# Patient Record
Sex: MALE | Race: WHITE | URBAN - METROPOLITAN AREA
[De-identification: names, ages, dates, MRNs, and addresses within clinical notes are randomized per-mention and may not be internally consistent; named-entity substitution may affect disease eponyms.]

---

## 2017-05-17 ENCOUNTER — TRANSFERRED RECORDS (OUTPATIENT)
Dept: HEALTH INFORMATION MANAGEMENT | Facility: CLINIC | Age: 82
End: 2017-05-17

## 2017-05-24 ENCOUNTER — TRANSFERRED RECORDS (OUTPATIENT)
Dept: HEALTH INFORMATION MANAGEMENT | Facility: CLINIC | Age: 82
End: 2017-05-24

## 2017-06-08 ENCOUNTER — TRANSFERRED RECORDS (OUTPATIENT)
Dept: HEALTH INFORMATION MANAGEMENT | Facility: CLINIC | Age: 82
End: 2017-06-08

## 2017-06-30 ENCOUNTER — TRANSFERRED RECORDS (OUTPATIENT)
Dept: HEALTH INFORMATION MANAGEMENT | Facility: CLINIC | Age: 82
End: 2017-06-30

## 2018-06-25 ENCOUNTER — TRANSFERRED RECORDS (OUTPATIENT)
Dept: HEALTH INFORMATION MANAGEMENT | Facility: CLINIC | Age: 83
End: 2018-06-25

## 2018-08-04 ASSESSMENT — ENCOUNTER SYMPTOMS
STIFFNESS: 1
BACK PAIN: 1
MUSCLE WEAKNESS: 1
JOINT SWELLING: 0
ARTHRALGIAS: 1
MUSCLE CRAMPS: 1
MYALGIAS: 1
NECK PAIN: 1

## 2018-08-09 NOTE — TELEPHONE ENCOUNTER
FUTURE VISIT INFORMATION      FUTURE VISIT INFORMATION:    Date: 08/16/2018    Time: 12:00 pm     [place] [facility] [place]:    Referring provider:  Dr Donna Martini    Referring providers clinic:      Reason for visit/diagnosis        NOTES (FOR ALL VISITS) STATUS DETAILS   OFFICE NOTE from referring provider Care Everywhere 07/09/2018   OFFICE NOTE from other specialist N/A    DISCHARGE SUMMARY from hospital N/A    DISCHARGE REPORT from the ER N/A    OPERATIVE REPORT N/A    MEDICATION LIST Internal    IMAGING  (FOR ALL VISITS)     EMG N/A    EEG N/A    ECT N/A    MRI (HEAD, NECK, SPINE) Internal PACS  06/09/2017, 06/15/2015   CT (HEAD, NECK, SPINE) N/A    OTHER     Interpretation of Outside MR Spine (06/15/2015 1:35 PM)  DX Lumbar Spine 2-3 Views (06/22/2015 1:06 PM)

## 2018-08-16 ENCOUNTER — PRE VISIT (OUTPATIENT)
Dept: NEUROLOGY | Facility: CLINIC | Age: 83
End: 2018-08-16

## 2018-08-16 ENCOUNTER — OFFICE VISIT (OUTPATIENT)
Dept: NEUROLOGY | Facility: CLINIC | Age: 83
End: 2018-08-16
Payer: MEDICARE

## 2018-08-16 VITALS
SYSTOLIC BLOOD PRESSURE: 134 MMHG | DIASTOLIC BLOOD PRESSURE: 69 MMHG | HEIGHT: 69 IN | WEIGHT: 148 LBS | HEART RATE: 73 BPM | BODY MASS INDEX: 21.92 KG/M2

## 2018-08-16 DIAGNOSIS — G56.21 NEUROPATHY, ULNAR AT ELBOW, RIGHT: ICD-10-CM

## 2018-08-16 DIAGNOSIS — G54.5 PARSONAGE-TURNER SYNDROME: Primary | ICD-10-CM

## 2018-08-16 DIAGNOSIS — G54.0 BRACHIAL PLEXOPATHY: Primary | ICD-10-CM

## 2018-08-16 RX ORDER — GABAPENTIN 100 MG/1
100 CAPSULE ORAL 3 TIMES DAILY
Qty: 90 CAPSULE | Refills: 11 | Status: SHIPPED | OUTPATIENT
Start: 2018-08-16 | End: 2018-09-14

## 2018-08-16 RX ORDER — METOPROLOL SUCCINATE 25 MG/1
25 TABLET, EXTENDED RELEASE ORAL
COMMUNITY
Start: 2018-06-04

## 2018-08-16 RX ORDER — LOSARTAN POTASSIUM 25 MG/1
25 TABLET ORAL DAILY
Refills: 3 | COMMUNITY
Start: 2018-06-22

## 2018-08-16 RX ORDER — NITROGLYCERIN 0.4 MG/1
0.4 TABLET SUBLINGUAL
COMMUNITY
Start: 2018-06-04

## 2018-08-16 RX ORDER — AMLODIPINE BESYLATE 5 MG/1
2.5 TABLET ORAL
COMMUNITY
Start: 2018-06-04

## 2018-08-16 RX ORDER — LANOLIN ALCOHOL/MO/W.PET/CERES
CREAM (GRAM) TOPICAL
COMMUNITY
Start: 2012-06-07

## 2018-08-16 ASSESSMENT — PAIN SCALES - GENERAL: PAINLEVEL: NO PAIN (0)

## 2018-08-16 NOTE — NURSING NOTE
Chief Complaint   Patient presents with     Consult     ump new shoulder weakness     Haylee Alba

## 2018-08-16 NOTE — PROGRESS NOTES
[facility]    Stanislav Lundy MRN# [id number]   Age: 83 year old YOB: 1934     Requesting physician: Kurtis Davila     Chief Complaint:     Referred by Dr Martini and Dr Whiting for right shoulder pain assessment      History of Present Illness:   Stanislav Lundy is a 83 year old right-handed gentleman who was referred to the neurology clinic by Dr. Donna Martini for right shouler pain and weakness out of proportion to the orthopedic injury of rotator cuff tear.    Mr. Lundy states that on February 1, 2018, he suddenly started to have severe pain in his right shoulder without any preceding injury. At the time, he went and saw orthopedics doctor in [place], and was told that he had a rotator cuff tear. He was given steroid injection and sent to physical therapy. He reports that physical therapy helped, however, he continued to have pain. On May 13, 2018, he went back to orthopedics for which he received another steroid injection. As the pain persisted, he saught another orthopedics opinion at [facility] in [place]. He was told that his pain seems to be out of proportion for rotator cuff tear and that he may have a nerve damage. Currently, he describes his pain as a shooting pain that starts proximal in the anterior shoulder and radiates distally into the forearm but not the hand. He keeps the arm internally rotated as external rotation reproduces the pain. He has to lift his right arm up with the left to elevate the arm above his shoulder.  Shooting pains radiate down the arm with movement of the arm not the neck.     He does think he has lost muscle bulk in the arm.    He has no numbness in his hand.      Past Medical History:   Diagnosis Date     Chronic rhinitis     Abstracted 06/12/02     Coronary atherosclerosis of unspecified type of vessel, native or graft 1990     Generalized osteoarthrosis, unspecified site     especially low back     Other and  unspecified hyperlipidemia        Patient Active Problem List   Diagnosis     Coronary atherosclerosis     Hyperlipidemia     Generalized osteoarthrosis, unspecified site     Chronic rhinitis       Past Surgical History:   Procedure Laterality Date     C NONSPECIFIC PROCEDURE      CABG, 3-vessel     C NONSPECIFIC PROCEDURE      Partial thyroidectomy     C NONSPECIFIC PROCEDURE  ,     S/P bilateral rotator cuff  , bilateral again in      C NONSPECIFIC PROCEDURE      S/P Right forearm fracture secondary to fall with a re-do graft from hip  Abstracted 02     C NONSPECIFIC PROCEDURE      S/P T&A     C NONSPECIFIC PROCEDURE      Dupuytren's contracture     C NONSPECIFIC PROCEDURE      Hardware removed from right forearm     HC COLONOSCOPY THRU STOMA, DIAGNOSTIC  2004    Normal     HC FLEX SIGMOIDOSCOPY W/WO MIKEL SPEC BY BRUSH/WASH  2001    Normal to 60 cm       Social History     Social History     Marital status:      Spouse name: Saray     Number of children: 2     Years of education: N/A     Occupational History     [employer] Estate Retired     Social History Main Topics     Smoking status: Former Smoker     Packs/day: 1.00     Types: Cigarettes     Quit date: 1978     Smokeless tobacco: Not on file     Alcohol use No     Drug use: No     Sexual activity: Not Currently     Other Topics Concern      Service No     Blood Transfusions No     Social History Narrative    Semi-retired .       Family History   Problem Relation Age of Onset     C.A.D. Mother       age 90     Hypertension Mother      Lipids Mother      C.A.D. Father       age 90, first MI in his mid 60's     Hypertension Father      Lipids Father      C.A.D. Brother       age 70 of heart problems and Prostate CA     Family History Negative Brother      Born 1928     Family History Negative Brother      Born 1941     Family History Negative Sister       "Born 1930, has COPD     Family History Negative Sister      Born , has COPD     Family History Negative Brother       in MVA age 19     Cancer Brother      Born 1936, has had melanoma resected for apparent cure       Current Outpatient Prescriptions   Medication Sig     amLODIPine (NORVASC) 5 MG tablet Take 5 mg by mouth     apixaban ANTICOAGULANT (ELIQUIS) 5 MG tablet      cyanocobalamin (VITAMIN  B-12) 1000 MCG tablet 4 times weekly     gabapentin (NEURONTIN) 100 MG capsule Take 1 capsule (100 mg) by mouth 3 times daily     metoprolol succinate (TOPROL-XL) 25 MG 24 hr tablet Take 25 mg by mouth     nitroGLYcerin (NITROSTAT) 0.4 MG sublingual tablet Place 0.4 mg under the tongue     Omega-3 Fatty Acids (OMEGA 3 PO)      ALTACE 2.5 MG OR CAPS 1 CAPSULE EVERY DAY     LIPITOR 10 MG OR TABS 1 tab PO QD (Once per day)     losartan (COZAAR) 25 MG tablet Take 25 mg by mouth daily     NASACORT 55 MCG/ACT NA AERS 2 SPRAYS IN EACH NOSTRIL QD (Once per day)     NITROGLYCERIN 0.4 MG SL SUBL 1 sublingually prn chest discomfort, may repeat each five minutes if needed to a maximum of 3 consecutive tablets     TOPROL XL 25 MG OR TB24 1 TABLET DAILY     No current facility-administered medications for this visit.           Allergies   Allergen Reactions     Cats      Mold Other (See Comments)     Niacin Unknown     Elevated liver tests     No Clinical Screening - See Comments Other (See Comments)     No Known Drug Allergies        ROS: Please see HPI all other systems review and negative.    Physical Examination:  /69  Pulse 73  Ht 1.753 m (5' 9\")  Wt 67.1 kg (148 lb)  BMI 21.86 kg/m2  General Appearance:  Well groomed and cooperative with examination. NAD  Neurological Examination  Cognition: No aphasia or dysarthria. Grossly oriented . Attention and recall intact.  Cranial Nerves:   General Motor Survey: Atrophy of right deltoid muscle, infraspinatus, dorsal interossei, weakness of the right interosseous muscles " 4/5, slight decrease of strength of the right upper extremity possibly due to pain. No tremor. Muscle bulk tone and strength in left UE and bilateral lowers are normal. No winging of scapula  Coordination: Normal coordination  Reflexes: Reflexes diffuse hyporeflexic 1+ in UE and LE, plantar responses are flexor   Sensory Examination:   Vibration: normal in upper extremity   Pinprick: decreased on posterior aspect of the right upper arm    Light Touch: decreased on posterior aspect of the right upper arm  Gait: Normal gait which is stable on turns.     Cardiovascular Examination:  Heart is regular in rate and rhythm to auscultation. No significant murmurs. No carotid bruits. No significant peripheral edema. Pedal pulses are palpable bilaterally.     Musculoskeletal Examination:  Neck rotation is limited on the right side      Impression/Recommendations:  1.  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  In addition the patient has developed muscle atrophy and weakness.  I have some concern this represents a idiopathic brachial plexopathy which traditionally presents with a significant amount of pain followed by atrophy, weakness and numbness.    Thankfully Dr. Kessler kindly worked the patient in for an EMG at the time of his visit today.  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  I will send the patient for an MRI of the brachial plexus to make sure we are not missing any malignancy or inflammatory lesion.  The patient has a 40-pack-year history of smoking so we want to make sure we rule this out.  If normal continued observation for healing and pain management would be the next steps.  I did start the patient on gabapentin 100 mg 3 times daily.  I cautioned him regarding possible side effects including dizziness and gait  instability.      Scribed by SYLVIA Martinez for Dr. Jennifer Villa MD.     I personally met with the patient and performed the history and physical examination to confirm all findings.  I have reviewed Ms. Manriquez's documentation and edited for corrections.    Jennifer Villa MD [facility]  Department of Neurology  Pager [phone]        Answers for HPI/ROS submitted by the patient on 8/4/2018   General Symptoms: No  Skin Symptoms: No  HENT Symptoms: No  EYE SYMPTOMS: No  HEART SYMPTOMS: No  LUNG SYMPTOMS: No  INTESTINAL SYMPTOMS: No  URINARY SYMPTOMS: No  REPRODUCTIVE SYMPTOMS: No  SKELETAL SYMPTOMS: Yes  BLOOD SYMPTOMS: No  NERVOUS SYSTEM SYMPTOMS: No  MENTAL HEALTH SYMPTOMS: No  Back pain: Yes  Muscle aches: Yes  Neck pain: Yes  Swollen joints: No  Joint pain: Yes  Bone pain: Yes  Muscle cramps: Yes  Muscle weakness: Yes  Joint stiffness: Yes  Bone fracture: No  PHQ-2 Score: 1      Scribed by SYLVIA Martinez for Dr. Jennifer Villa MD.

## 2018-08-16 NOTE — MR AVS SNAPSHOT
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 Bring a list of your current medicines to your exam (including vitamins, minerals and over-the-counter drugs).  You may or may not receive intravenous (IV) contrast for this exam pending the discretion of the Radiologist.  You do not need to do anything special to prepare.  The MRI machine uses a strong magnet. Please wear clothes without metal (snaps, zippers). A sweatsuit works well, or we may give you a hospital gown.  Please remove any body piercings and hair extensions before you arrive. You will also remove watches, jewelry, hairpins, wallets, dentures, partial dental plates and hearing aids. You may wear contact lenses, and you may be able to wear your rings. We have a safe place to keep your personal items, but it is safer to leave them at home.  **IMPORTANT** THE INSTRUCTIONS BELOW ARE ONLY FOR THOSE PATIENTS WHO HAVE BEEN PRESCRIBED SEDATION OR GENERAL ANESTHESIA DURING THEIR MRI PROCEDURE:  IF YOUR DOCTOR PRESCRIBED ORAL SEDATION (take medicine to help you relax during your exam):   You must get the medicine from your doctor (oral medication) before you arrive. Bring the medicine to the exam. Do not take it at home. You ll be told when to take it upon  arriving for your exam.   Arrive one hour early. Bring someone who can take you home after the test. Your medicine will make you sleepy. After the exam, you may not drive, take a bus or take a taxi by yourself.  IF YOUR DOCTOR PRESCRIBED IV SEDATION:   Arrive one hour early. Bring someone who can take you home after the test. Your medicine will make you sleepy. After the exam, you may not drive, take a bus or take a taxi by yourself.   No eating 6 hours before your exam. You may have clear liquids up until 4 hours before your exam. (Clear liquids include water, clear tea, black coffee and fruit juice without pulp.)  IF YOUR DOCTOR PRESCRIBED ANESTHESIA (be asleep for your exam):   Arrive 1 1/2 hours early. Bring someone who can take you home after the test. You may not drive, take a bus or take a taxi by yourself.   No eating 8 hours before your exam. You may have clear liquids up until 4 hours before your exam. (Clear liquids include water, clear tea, black coffee and fruit juice without pulp.)   You will spend four to five hours in the recovery room.  Please call the Imaging Department at your exam site with any questions.            Sep 13, 2018 12:30 PM CDT   (Arrive by 12:15 PM)   Return Visit with Jennifer Villa MD   [facility] ([facility])    [address] [place] [address] [phone]              Future tests that were ordered for you today     Open Future Orders        Priority Expected Expires Ordered    Needle EMG Each Extremity w/Paraspinal Area Complete (59078) Routine  8/16/2019 8/16/2018    MR Brachial Plexus Right wo & w Contrast Routine  8/16/2019 8/16/2018    EMG Routine  8/16/2019 8/16/2018            Who to contact     Please call your clinic at [phone] to:    Ask questions about your health    Make or cancel appointments    Discuss your medicines    Learn about your test results    Speak to your doctor             Additional Information About Your Visit        [employer] Information     [employer] gives you secure access to your electronic health record. If you see a primary care provider, you can also send messages to your care team and make appointments. If you have questions, please call your primary care clinic.  If you do not have a primary care provider, please call [phone] and they will assist you.      [employer] is an electronic gateway that provides easy, online access to your medical records. With [employer], you can request a clinic appointment, read your test results, renew a prescription or communicate with your care team.     To access your existing account, please contact your [facility] or call [phone] for assistance.        Care EveryWhere ID     This is your Care EveryWhere ID. This could be used by other organizations to access your [facility] medical records  [id number]         Blood Pressure from Last 3 Encounters:   08/16/18 134/69   08/05/05 152/74   07/18/03 110/60    Weight from Last 3 Encounters:   08/16/18 67.1 kg (148 lb)   08/05/05 79.4 kg (175 lb)   07/18/03 73.9 kg (163 lb)              We Performed the Following     HC NCS MOTOR W OR W/O F-WAVE, 9 OR 10          Today's Medication Changes          These changes are accurate as of 8/16/18  2:33 PM.  If you have any questions, ask your nurse or doctor.               Start taking these medicines.        Dose/Directions    gabapentin 100 MG capsule   Commonly known as:  NEURONTIN   Used for:  Parsonage-Arteaga syndrome   Started by:  Jennifer Villa MD        Dose:  100 mg   Take 1 capsule (100 mg) by mouth 3 times daily   Quantity:  90 capsule   Refills:  11         Stop taking these medicines if you haven't already. Please contact your care team if you have questions.     aspirin 81 MG tablet   Stopped by:  Jennifer Villa MD                Where to get your medicines      These medications  were sent to [facility] [id number] [facility] - [place], [place] - [address]  [address], [facility] [address]     Phone:  [phone]     gabapentin 100 MG capsule                Primary Care Provider Office Phone # Fax #    Kurtis Masterson [phone]       [facility] [address] [place] [address]        Equal Access to Services     [facility] Center[facility]SEMARY : Hadii aad ku hadasho Soomaali, waaxda luqadaha, qaybta kaalmada adeegyada, waxay idiin hayaan adeeg kharash la'aan . So [facility] [phone].    ATENCIÓN: Si habla español, tiene a lu disposición servicios gratuitos de asistencia lingüística. [place] al [phone].    We comply with applicable federal civil rights laws and [place] laws. We do not discriminate on the basis of race, color, national origin, age, disability, sex, sexual orientation, or gender identity.            Thank you!     Thank you for choosing [facility]  for your care. Our goal is always to provide you with excellent care. Hearing back from our patients is one way we can continue to improve our services. Please take a few minutes to complete the written survey that you may receive in the mail after your visit with us. Thank you!             Your Updated Medication List - Protect others around you: Learn how to safely use, store and throw away your medicines at www.disposemymeds.org.          This list is accurate as of 8/16/18  2:33 PM.  Always use your most recent med list.                   Brand Name Dispense Instructions for use Diagnosis    ALTACE 2.5 MG capsule   Generic drug:  ramipril     60    1 CAPSULE EVERY DAY        amLODIPine 5 MG tablet    NORVASC     Take 5 mg by mouth        cyanocobalamin 1000 MCG tablet    vitamin  B-12     4 times weekly        ELIQUIS 5 MG tablet   Generic drug:  apixaban ANTICOAGULANT           gabapentin 100 MG capsule    NEURONTIN    90 capsule    Take 1 capsule (100 mg) by mouth 3 times daily    Lou  syndrome       LIPITOR 10 MG tablet   Generic drug:  atorvastatin     90    1 tab PO QD (Once per day)        losartan 25 MG tablet    COZAAR     Take 25 mg by mouth daily        NASACORT 55 MCG/ACT Aers   Generic drug:  TRIAMCINOLONE ACETONIDE(NASAL)     1    2 SPRAYS IN EACH NOSTRIL QD (Once per day)        * nitroGLYcerin 0.4 MG sublingual tablet    NITROSTAT    25    1 sublingually prn chest discomfort, may repeat each five minutes if needed to a maximum of 3 consecutive tablets        * nitroGLYcerin 0.4 MG sublingual tablet    NITROSTAT     Place 0.4 mg under the tongue        OMEGA 3 PO           * TOPROL XL 25 MG 24 hr tablet   Generic drug:  metoprolol succinate      1 TABLET DAILY    Coronary atherosclerosis of unspecified type of vessel, native or graft, Essential and other specified forms of tremor       * metoprolol succinate 25 MG 24 hr tablet    TOPROL-XL     Take 25 mg by mouth        * Notice:  This list has 4 medication(s) that are the same as other medications prescribed for you. Read the directions carefully, and ask your doctor or other care provider to review them with you.

## 2018-08-16 NOTE — LETTER
8/16/2018     RE: Stanislav Lundy  [address]  [facility] [address]     Dear Colleague,    Thank you for referring your patient, Stanislav Lundy, to the [facility] NEUROLOGY at [facility]. Please see a copy of my visit note below.        [facility]    Stanislav Lundy MRN# [id number]   Age: 83 year old YOB: 1934     Requesting physician: Kurtis Davila     Chief Complaint:     Referred by Dr Martini and Dr Whiting for right shoulder pain assessment      History of Present Illness:   Stanislav Lundy is a 83 year old right-handed gentleman who was referred to the neurology clinic by Dr. Donna Martini for right shouler pain and weakness out of proportion to the orthopedic injury of rotator cuff tear.    Mr. Lundy states that on February 1, 2018, he suddenly started to have severe pain in his right shoulder without any preceding injury. At the time, he went and saw orthopedics doctor in [place], and was told that he had a rotator cuff tear. He was given steroid injection and sent to physical therapy. He reports that physical therapy helped, however, he continued to have pain. On May 13, 2018, he went back to orthopedics for which he received another steroid injection. As the pain persisted, he saught another orthopedics opinion at [facility] in [place]. He was told that his pain seems to be out of proportion for rotator cuff tear and that he may have a nerve damage. Currently, he describes his pain as a shooting pain that starts proximal in the anterior shoulder and radiates distally into the forearm but not the hand. He keeps the arm internally rotated as external rotation reproduces the pain. He has to lift his right arm up with the left to elevate the arm above his shoulder.  Shooting pains radiate down the arm with movement of the arm not the neck.     He does think he has lost muscle bulk in the arm.    He has  no numbness in his hand.      Past Medical History:   Diagnosis Date     Chronic rhinitis     Abstracted 02     Coronary atherosclerosis of unspecified type of vessel, native or graft      Generalized osteoarthrosis, unspecified site     especially low back     Other and unspecified hyperlipidemia        Patient Active Problem List   Diagnosis     Coronary atherosclerosis     Hyperlipidemia     Generalized osteoarthrosis, unspecified site     Chronic rhinitis       Past Surgical History:   Procedure Laterality Date     C NONSPECIFIC PROCEDURE      CABG, 3-vessel     C NONSPECIFIC PROCEDURE      Partial thyroidectomy     C NONSPECIFIC PROCEDURE  ,     S/P bilateral rotator cuff  , bilateral again in      C NONSPECIFIC PROCEDURE      S/P Right forearm fracture secondary to fall with a re-do graft from hip  Abstracted 02     C NONSPECIFIC PROCEDURE      S/P T&A     C NONSPECIFIC PROCEDURE      Dupuytren's contracture     C NONSPECIFIC PROCEDURE      Hardware removed from right forearm     HC COLONOSCOPY THRU STOMA, DIAGNOSTIC  2004    Normal     HC FLEX SIGMOIDOSCOPY W/WO MIKEL SPEC BY BRUSH/WASH  2001    Normal to 60 cm       Social History     Social History     Marital status:      Spouse name: Saray     Number of children: 2     Years of education: N/A     Occupational History     [employer] Estate Retired     Social History Main Topics     Smoking status: Former Smoker     Packs/day: 1.00     Types: Cigarettes     Quit date: 1978     Smokeless tobacco: Not on file     Alcohol use No     Drug use: No     Sexual activity: Not Currently     Other Topics Concern      Service No     Blood Transfusions No     Social History Narrative    Semi-retired .       Family History   Problem Relation Age of Onset     C.A.D. Mother       age 90     Hypertension Mother      Lipids Mother      C.A.D. Father       age 90,  "first MI in his mid 60's     Hypertension Father      Lipids Father      PAULO.[email or web address].BHAVANI. Brother       age 70 of heart problems and Prostate CA     Family History Negative Brother      Born 1928     Family History Negative Brother      Born 1     Family History Negative Sister      Born 1930, has COPD     Family History Negative Sister      Born , has COPD     Family History Negative Brother       in MVA age 19     Cancer Brother      Born 1936, has had melanoma resected for apparent cure       Current Outpatient Prescriptions   Medication Sig     amLODIPine (NORVASC) 5 MG tablet Take 5 mg by mouth     apixaban ANTICOAGULANT (ELIQUIS) 5 MG tablet      cyanocobalamin (VITAMIN  B-12) 1000 MCG tablet 4 times weekly     gabapentin (NEURONTIN) 100 MG capsule Take 1 capsule (100 mg) by mouth 3 times daily     metoprolol succinate (TOPROL-XL) 25 MG 24 hr tablet Take 25 mg by mouth     nitroGLYcerin (NITROSTAT) 0.4 MG sublingual tablet Place 0.4 mg under the tongue     Omega-3 Fatty Acids (OMEGA 3 PO)      ALTACE 2.5 MG OR CAPS 1 CAPSULE EVERY DAY     LIPITOR 10 MG OR TABS 1 tab PO QD (Once per day)     losartan (COZAAR) 25 MG tablet Take 25 mg by mouth daily     NASACORT 55 MCG/ACT NA AERS 2 SPRAYS IN EACH NOSTRIL QD (Once per day)     NITROGLYCERIN 0.4 MG SL SUBL 1 sublingually prn chest discomfort, may repeat each five minutes if needed to a maximum of 3 consecutive tablets     TOPROL XL 25 MG OR TB24 1 TABLET DAILY     No current facility-administered medications for this visit.           Allergies   Allergen Reactions     Cats      Mold Other (See Comments)     Niacin Unknown     Elevated liver tests     No Clinical Screening - See Comments Other (See Comments)     No Known Drug Allergies        ROS: Please see HPI all other systems review and negative.    Physical Examination:  /69  Pulse 73  Ht 1.753 m (5' 9\")  Wt 67.1 kg (148 lb)  BMI 21.86 kg/m2  General Appearance:  Well groomed and cooperative " with examination. NAD  Neurological Examination  Cognition: No aphasia or dysarthria. Grossly oriented . Attention and recall intact.  Cranial Nerves:   General Motor Survey: Atrophy of right deltoid muscle, infraspinatus, dorsal interossei, weakness of the right interosseous muscles 4/5, slight decrease of strength of the right upper extremity possibly due to pain. No tremor. Muscle bulk tone and strength in left UE and bilateral lowers are normal. No winging of scapula  Coordination: Normal coordination  Reflexes: Reflexes diffuse hyporeflexic 1+ in UE and LE, plantar responses are flexor   Sensory Examination:   Vibration: normal in upper extremity   Pinprick: decreased on posterior aspect of the right upper arm    Light Touch: decreased on posterior aspect of the right upper arm  Gait: Normal gait which is stable on turns.     Cardiovascular Examination:  Heart is regular in rate and rhythm to auscultation. No significant murmurs. No carotid bruits. No significant peripheral edema. Pedal pulses are palpable bilaterally.     Musculoskeletal Examination:  Neck rotation is limited on the right side      Impression/Recommendations:  1.  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  In addition the patient has developed muscle atrophy and weakness.  I have some concern this represents a idiopathic brachial plexopathy which traditionally presents with a significant amount of pain followed by atrophy, weakness and numbness.    Thankfully Dr. Kessler kindly worked the patient in for an EMG at the time of his visit today.  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  I will send the patient for an MRI of the brachial plexus to make sure we are not missing any malignancy or inflammatory lesion.  The patient has a 40-pack-year history of  smoking so we want to make sure we rule this out.  If normal continued observation for healing and pain management would be the next steps.  I did start the patient on gabapentin 100 mg 3 times daily.  I cautioned him regarding possible side effects including dizziness and gait instability.      Scribed by SYLVIA Martinez for Dr. Jennifer Villa MD.     I personally met with the patient and performed the history and physical examination to confirm all findings.  I have reviewed Ms. Manriquez's documentation and edited for corrections.    Jennifer Villa MD [facility]  Department of Neurology  Pager [phone]        Answers for HPI/ROS submitted by the patient on 8/4/2018   General Symptoms: No  Skin Symptoms: No  HENT Symptoms: No  EYE SYMPTOMS: No  HEART SYMPTOMS: No  LUNG SYMPTOMS: No  INTESTINAL SYMPTOMS: No  URINARY SYMPTOMS: No  REPRODUCTIVE SYMPTOMS: No  SKELETAL SYMPTOMS: Yes  BLOOD SYMPTOMS: No  NERVOUS SYSTEM SYMPTOMS: No  MENTAL HEALTH SYMPTOMS: No  Back pain: Yes  Muscle aches: Yes  Neck pain: Yes  Swollen joints: No  Joint pain: Yes  Bone pain: Yes  Muscle cramps: Yes  Muscle weakness: Yes  Joint stiffness: Yes  Bone fracture: No  PHQ-2 Score: 1    Scribed by Ilene Manriquez MS3 for Dr. Jennifer Villa MD.     Again, thank you for allowing me to participate in the care of your patient.      Sincerely,    Jennifer Villa MD

## 2018-08-16 NOTE — MR AVS SNAPSHOT
After Visit Summary   8/16/2018    Stanislav Lundy    MRN: [id number]           Patient Information     Date Of Birth          9/7/1934        Visit Information        Provider Department      8/16/2018 12:00 PM Jennifer Villa MD [facility] Neurology        Today's Diagnoses     Parsonage-Arteaga syndrome    -  1       Follow-ups after your visit        Follow-up notes from your care team     Return in about 6 weeks (around 9/27/2018).      Your next 10 appointments already scheduled     Aug 25, 2018  8:30 AM CDT   MR BRACHIAL PLEXUS RIGHT WO & W CONTRAST with [id number]   [facility] [place] [facility] ([facility])    [address]  [facility] [address] [phone]           Take your medicines as usual, unless your doctor tells you not to. Bring a list of your current medicines to your exam (including vitamins, minerals and over-the-counter drugs).  You may or may not receive intravenous (IV) contrast for this exam pending the discretion of the Radiologist.  You do not need to do anything special to prepare.  The MRI machine uses a strong magnet. Please wear clothes without metal (snaps, zippers). A sweatsuit works well, or we may give you a hospital gown.  Please remove any body piercings and hair extensions before you arrive. You will also remove watches, jewelry, hairpins, wallets, dentures, partial dental plates and hearing aids. You may wear contact lenses, and you may be able to wear your rings. We have a safe place to keep your personal items, but it is safer to leave them at home.  **IMPORTANT** THE INSTRUCTIONS BELOW ARE ONLY FOR THOSE PATIENTS WHO HAVE BEEN PRESCRIBED SEDATION OR GENERAL ANESTHESIA DURING THEIR MRI PROCEDURE:  IF YOUR DOCTOR PRESCRIBED ORAL SEDATION (take medicine to help you relax during your exam):   You must get the medicine from your doctor (oral medication) before you arrive. Bring the medicine to the exam. Do  not take it at home. You ll be told when to take it upon arriving for your exam.   Arrive one hour early. Bring someone who can take you home after the test. Your medicine will make you sleepy. After the exam, you may not drive, take a bus or take a taxi by yourself.  IF YOUR DOCTOR PRESCRIBED IV SEDATION:   Arrive one hour early. Bring someone who can take you home after the test. Your medicine will make you sleepy. After the exam, you may not drive, take a bus or take a taxi by yourself.   No eating 6 hours before your exam. You may have clear liquids up until 4 hours before your exam. (Clear liquids include water, clear tea, black coffee and fruit juice without pulp.)  IF YOUR DOCTOR PRESCRIBED ANESTHESIA (be asleep for your exam):   Arrive 1 1/2 hours early. Bring someone who can take you home after the test. You may not drive, take a bus or take a taxi by yourself.   No eating 8 hours before your exam. You may have clear liquids up until 4 hours before your exam. (Clear liquids include water, clear tea, black coffee and fruit juice without pulp.)   You will spend four to five hours in the recovery room.  Please call the Imaging Department at your exam site with any questions.            Sep 13, 2018 12:30 PM CDT   (Arrive by 12:15 PM)   Return Visit with Jennifer Villa MD   [facility] Neurology ([facility])    [address] [place] [address] [phone]              Future tests that were ordered for you today     Open Future Orders        Priority Expected Expires Ordered    MR Brachial Plexus Right wo & w Contrast Routine  8/16/2019 8/16/2018    EMG Routine  8/16/2019 8/16/2018            Who to contact     Please call your clinic at [phone] to:    Ask questions about your health    Make or cancel appointments    Discuss your medicines    Learn about your test results    Speak to your doctor            Additional Information About Your  "Visit        RatingBugharGetMaid Information     Rebls gives you secure access to your electronic health record. If you see a primary care provider, you can also send messages to your care team and make appointments. If you have questions, please call your primary care clinic.  If you do not have a primary care provider, please call [phone] and they will assist you.      Rebls is an electronic gateway that provides easy, online access to your medical records. With Rebls, you can request a clinic appointment, read your test results, renew a prescription or communicate with your care team.     To access your existing account, please contact your [facility] or call [phone] for assistance.        Care EveryWhere ID     This is your Care EveryWhere ID. This could be used by other organizations to access your [place] medical records  [id number]        Your Vitals Were     Pulse Height BMI (Body Mass Index)             73 1.753 m (5' 9\") 21.86 kg/m2          Blood Pressure from Last 3 Encounters:   08/16/18 134/69   08/05/05 152/74   07/18/03 110/60    Weight from Last 3 Encounters:   08/16/18 67.1 kg (148 lb)   08/05/05 79.4 kg (175 lb)   07/18/03 73.9 kg (163 lb)                 Today's Medication Changes          These changes are accurate as of 8/16/18  2:24 PM.  If you have any questions, ask your nurse or doctor.               Start taking these medicines.        Dose/Directions    gabapentin 100 MG capsule   Commonly known as:  NEURONTIN   Used for:  Parsonage-Arteaga syndrome   Started by:  Jennifer Villa MD        Dose:  100 mg   Take 1 capsule (100 mg) by mouth 3 times daily   Quantity:  90 capsule   Refills:  11         Stop taking these medicines if you haven't already. Please contact your care team if you have questions.     aspirin 81 MG tablet   Stopped by:  Jennifer Villa MD                Where to get your medicines      These medications were " sent to [facility] [address] - [place], [place] - [address]  [address], [place] [address]     Phone:  [phone]     gabapentin 100 MG capsule                Primary Care Provider Office Phone # Fax #    Kurtis Masterson [phone]       [facility] [address]        Equal Access to Services     [facility] CenterROSEMARY : Hadii aad ku hadasho Soomaali, waaxda luqadaha, qaybta kaalmada adeegyada, waxay idiin hayaan adeeg kharash la'aan . So [facility] [place] [phone].    ATENCIÓN: Si habla español, tiene a lu disposición servicios gratuitos de asistencia lingüística. [place] al [phone].    We comply with applicable federal civil rights laws and [place] laws. We do not discriminate on the basis of race, color, national origin, age, disability, sex, sexual orientation, or gender identity.            Thank you!     Thank you for choosing [facility] NEUROLOGY  for your care. Our goal is always to provide you with excellent care. Hearing back from our patients is one way we can continue to improve our services. Please take a few minutes to complete the written survey that you may receive in the mail after your visit with us. Thank you!             Your Updated Medication List - Protect others around you: Learn how to safely use, store and throw away your medicines at www.disposemymeds.org.          This list is accurate as of 8/16/18  2:24 PM.  Always use your most recent med list.                   Brand Name Dispense Instructions for use Diagnosis    ALTACE 2.5 MG capsule   Generic drug:  ramipril     60    1 CAPSULE EVERY DAY        amLODIPine 5 MG tablet    NORVASC     Take 5 mg by mouth        cyanocobalamin 1000 MCG tablet    vitamin  B-12     4 times weekly        ELIQUIS 5 MG tablet   Generic drug:  apixaban ANTICOAGULANT           gabapentin 100 MG capsule    NEURONTIN    90 capsule    Take 1 capsule (100 mg) by mouth 3 times daily    Lou  syndrome       LIPITOR 10 MG tablet   Generic drug:  atorvastatin     90    1 tab PO QD (Once per day)        losartan 25 MG tablet    COZAAR     Take 25 mg by mouth daily        NASACORT 55 MCG/ACT Aers   Generic drug:  TRIAMCINOLONE ACETONIDE(NASAL)     1    2 SPRAYS IN EACH NOSTRIL QD (Once per day)        * nitroGLYcerin 0.4 MG sublingual tablet    NITROSTAT    25    1 sublingually prn chest discomfort, may repeat each five minutes if needed to a maximum of 3 consecutive tablets        * nitroGLYcerin 0.4 MG sublingual tablet    NITROSTAT     Place 0.4 mg under the tongue        OMEGA 3 PO           * TOPROL XL 25 MG 24 hr tablet   Generic drug:  metoprolol succinate      1 TABLET DAILY    Coronary atherosclerosis of unspecified type of vessel, native or graft, Essential and other specified forms of tremor       * metoprolol succinate 25 MG 24 hr tablet    TOPROL-XL     Take 25 mg by mouth        * Notice:  This list has 4 medication(s) that are the same as other medications prescribed for you. Read the directions carefully, and ask your doctor or other care provider to review them with you.

## 2018-08-16 NOTE — LETTER
8/16/2018     RE: Stanislav Lundy  [address]  [facility] [address]     Dear Colleague,    Thank you for referring your patient, Stanislav Lundy, to the [facility] EMG at [facility]. Please see a copy of my visit note below.        [facility]  Electrodiagnostic Laboratory        Nerve Conduction & EMG Report       Patient:       Stanislav Lundy  Patient ID:    [id number]  Gender:        Male  YOB: 1934  Age:           83 Years 11 Months      Referring Physician: Jennifer Villa MD    History & Examination:    83 year old man with a 6 month history of pain at the right shoulder and limited range of motion. There is weakness of right shoulder external rotation and abduction. Query right brachial plexopathy/Parsonage Arteaga syndrome.    Techniques: Motor and sensory conduction studies were done with surface recording electrodes. Temperature was monitored and recorded throughout the study. Upper extremities were maintained at a temperature of 32 degrees Centigrade or higher. EMG was done with a concentric needle electrode.     Results:    Bilateral median antidromic sensory NCSs recorded from digit II were normal without significant side-to-side differences. Bilateral radial antidromic sensory NCSs recorded from the snuffbox were probably normal for age. An amplitude of 13.1-13.6 uV, with normal values of >15 uV, is of questionable significance in an 83 year-old patient. Right ulnar antidromic sensory NCS recorded from digit V showed normal SNAP amplitude and attenuated conduction velocity. Right lateral antebrachial cutaneous antidromic sensory NCS showed no response. Left lateral antebrachial cutaneous antidromic sensory NCS was normal. Right median motor NCS showed prolonged distal latency, normal CMAP amplitudes, and conduction velocity. Right ulnar motor NCS showed normal distal latency, and CMAP amplitudes, low-normal conduction  velocity at the forearm, and attenuated conduction velocity at the elbow.   Needle EMG interestingly showed no fibrillations/positive waves in any muscle studied at the right upper extremity. Complex repetitive discharges (CRDs) were present at the right deltoid, supraspinatus, and pronator teres. Reduced recruitment of some large, long duration, but non-polyphasic MUPs was noted at the right biceps, pronator teres, and EDC. Some large, long duration, non-polyphasic MUPs with normal recruitment patterns were appreciated at the right infraspinatus. All other muscles studied showed normal MUP morphology and recruitment patterns. EMG of right brachioradialis, triceps, FDI, and C6 paraspinals was normal.     Interpretation:    Abnormal study. There is electrodiagnostic evidence of a chronic/remote right brachial plexopathy, predominantly affecting the upper trunk, and to a lesser extent the middle. There is also electrodiagnostic evidence of a rather mild right ulnar neuropathy at the elbow.    EMG Physician:     Domo Kessler MD      Sensory NCS      Nerve / Sites Rec. Site Onset Peak Ref. NP Amp Ref. PP Amp Dist Mateo Ref. Temp     ms ms ms  V  V  V cm m/s m/s  C   R MEDIAN - Dig II Anti      Wrist Dig II 2.92 4.22  11.9 10.0 18.1 14 48.0 48.0 31.9   L MEDIAN - Dig II Anti      Wrist Dig II 2.92 4.22  13.9 10.0 20.4 14 48.0 48.0 31.9   R ULNAR - Dig V Anti      Wrist Dig V 3.13 4.06  8.2 8.0 1.8 12.5 40.0 48.0 30.7      B.Elbow Dig V 3.39 4.22  4.8  0.57    30.4   R RADIAL - Snuff      Forearm Snuff 1.93 2.60  13.1 15.0 12.1 12.5 64.9 48.0 34.3   L RADIAL - Snuff      Forearm Snuff 2.60 3.28  13.6 15.0 16.4 12.5 48.0 48.0 31.7   R MUSCULOCUTANEOUS      Elbow Forearm NR NR 3.30 NR  NR 12 NR  32   L MUSCULOCUTANEOUS      Elbow Forearm 1.35 2.03 3.30 5.3  11.8 8 59.1  31.5     Motor NCS      Nerve / Sites Rec. Site Lat Ref. Amp Ref. Rel Amp Dist Mateo Ref. Dur. Area Temp.     ms ms mV mV % cm m/s m/s ms %  C   R  MEDIAN - APB      Wrist APB 4.64 4.40 10.8 5.0 100 8   7.24 100 32.9      Elbow APB 10.05  10.0  93 27 49.8 48.0 7.66 151 32.7   R ULNAR - ADM      Wrist ADM 3.44 3.50 11.3 5.0 100 8   4.32 100 33.8      B.Elbow ADM 8.44  10.0  88.9 24 48.0 48.0 5.26 96.9 33.6      A.Elbow ADM 10.78  8.9  78.6 8 34.1 48.0 5.47 97.3 33.5       EMG Summary Table     Spontaneous MUAP Recruitment    IA Fib PSW Fasc H.F. Amp Dur. PPP Pattern   R. DELTOID N None None None CRD N N N N   R. BICEPS N None None None None N 1+ N Reduced   R. BRACHIORADIALIS N None None None None N N N N   R. PRON TERES N None None None CRD 1+ 2+ N Reduced   R. TRICEPS N None None None None N N N N   R. EXT DIG COMM N None None None None 1+ 1+ N Reduced   R. FIRST D INTEROSS N None None None None N N N N   R. SUPRASPINATUS N None None None CRD N N N N   R. INFRASPINATUS N None None None None 1+ 1+ N N   R. C6 PSP N None None None None N N N N                            Again, thank you for allowing me to participate in the care of your patient.      Sincerely,    Domo Kessler MD

## 2018-08-16 NOTE — PROGRESS NOTES
[facility]  Electrodiagnostic Laboratory    Nerve Conduction & EMG Report          Patient:       Stanislav Lundy  Patient ID:    [id number]  Gender:        Male  YOB: 1934  Age:           83 Years 11 Months      Referring Physician: Jennifer Villa MD    History & Examination:    83 year old man with a 6 month history of pain at the right shoulder and limited range of motion. There is weakness of right shoulder external rotation and abduction. Query right brachial plexopathy/Parsonage Arteaga syndrome.    Techniques: Motor and sensory conduction studies were done with surface recording electrodes. Temperature was monitored and recorded throughout the study. Upper extremities were maintained at a temperature of 32 degrees Centigrade or higher. EMG was done with a concentric needle electrode.     Results:    Bilateral median antidromic sensory NCSs recorded from digit II were normal without significant side-to-side differences. Bilateral radial antidromic sensory NCSs recorded from the snuffbox were probably normal for age. An amplitude of 13.1-13.6 uV, with normal values of >15 uV, is of questionable significance in an 83 year-old patient. Right ulnar antidromic sensory NCS recorded from digit V showed normal SNAP amplitude and attenuated conduction velocity. Right lateral antebrachial cutaneous antidromic sensory NCS showed no response. Left lateral antebrachial cutaneous antidromic sensory NCS was normal. Right median motor NCS showed prolonged distal latency, normal CMAP amplitudes, and conduction velocity. Right ulnar motor NCS showed normal distal latency, and CMAP amplitudes, low-normal conduction velocity at the forearm, and attenuated conduction velocity at the elbow.   Needle EMG interestingly showed no fibrillations/positive waves in any muscle studied at the right upper extremity. Complex repetitive discharges (CRDs) were present at the right deltoid, supraspinatus, and  pronator teres. Reduced recruitment of some large, long duration, but non-polyphasic MUPs was noted at the right biceps, pronator teres, and EDC. Some large, long duration, non-polyphasic MUPs with normal recruitment patterns were appreciated at the right infraspinatus. All other muscles studied showed normal MUP morphology and recruitment patterns. EMG of right brachioradialis, triceps, FDI, and C6 paraspinals was normal.     Interpretation:    Abnormal study. There is electrodiagnostic evidence of a chronic/remote right brachial plexopathy, predominantly affecting the upper trunk, and to a lesser extent the middle. There is also electrodiagnostic evidence of a rather mild right ulnar neuropathy at the elbow.    EMG Physician:     Domo Kessler MD      Sensory NCS      Nerve / Sites Rec. Site Onset Peak Ref. NP Amp Ref. PP Amp Dist Mateo Ref. Temp     ms ms ms  V  V  V cm m/s m/s  C   R MEDIAN - Dig II Anti      Wrist Dig II 2.92 4.22  11.9 10.0 18.1 14 48.0 48.0 31.9   L MEDIAN - Dig II Anti      Wrist Dig II 2.92 4.22  13.9 10.0 20.4 14 48.0 48.0 31.9   R ULNAR - Dig V Anti      Wrist Dig V 3.13 4.06  8.2 8.0 1.8 12.5 40.0 48.0 30.7      B.Elbow Dig V 3.39 4.22  4.8  0.57    30.4   R RADIAL - Snuff      Forearm Snuff 1.93 2.60  13.1 15.0 12.1 12.5 64.9 48.0 34.3   L RADIAL - Snuff      Forearm Snuff 2.60 3.28  13.6 15.0 16.4 12.5 48.0 48.0 31.7   R MUSCULOCUTANEOUS      Elbow Forearm NR NR 3.30 NR  NR 12 NR  32   L MUSCULOCUTANEOUS      Elbow Forearm 1.35 2.03 3.30 5.3  11.8 8 59.1  31.5     Motor NCS      Nerve / Sites Rec. Site Lat Ref. Amp Ref. Rel Amp Dist Mateo Ref. Dur. Area Temp.     ms ms mV mV % cm m/s m/s ms %  C   R MEDIAN - APB      Wrist APB 4.64 4.40 10.8 5.0 100 8   7.24 100 32.9      Elbow APB 10.05  10.0  93 27 49.8 48.0 7.66 151 32.7   R ULNAR - ADM      Wrist ADM 3.44 3.50 11.3 5.0 100 8   4.32 100 33.8      B.Elbow ADM 8.44  10.0  88.9 24 48.0 48.0 5.26 96.9 33.6      A.Elbow ADM 10.78  8.9   78.6 8 34.1 48.0 5.47 97.3 33.5       EMG Summary Table     Spontaneous MUAP Recruitment    IA Fib PSW Fasc H.F. Amp Dur. PPP Pattern   R. DELTOID N None None None CRD N N N N   R. BICEPS N None None None None N 1+ N Reduced   R. BRACHIORADIALIS N None None None None N N N N   R. PRON TERES N None None None CRD 1+ 2+ N Reduced   R. TRICEPS N None None None None N N N N   R. EXT DIG COMM N None None None None 1+ 1+ N Reduced   R. FIRST D INTEROSS N None None None None N N N N   R. SUPRASPINATUS N None None None CRD N N N N   R. INFRASPINATUS N None None None None 1+ 1+ N N   R. C6 PSP N None None None None N N N N

## 2018-08-25 ENCOUNTER — RADIANT APPOINTMENT (OUTPATIENT)
Dept: MRI IMAGING | Facility: CLINIC | Age: 83
End: 2018-08-25
Attending: PSYCHIATRY & NEUROLOGY
Payer: MEDICARE

## 2018-08-25 DIAGNOSIS — G54.5 PARSONAGE-TURNER SYNDROME: ICD-10-CM

## 2018-08-25 LAB
CREAT BLD-MCNC: 0.8 MG/DL (ref 0.66–1.25)
GFR SERPL CREATININE-BSD FRML MDRD: >90 ML/MIN/1.7M2

## 2018-08-25 RX ORDER — GADOBUTROL 604.72 MG/ML
7.5 INJECTION INTRAVENOUS ONCE
Status: COMPLETED | OUTPATIENT
Start: 2018-08-25 | End: 2018-08-25

## 2018-08-25 RX ADMIN — GADOBUTROL 6.5 ML: 604.72 INJECTION INTRAVENOUS at 08:45

## 2018-08-25 NOTE — DISCHARGE INSTRUCTIONS
MRI Contrast Discharge Instructions    The IV contrast you received today will pass out of your body in your  urine. This will happen in the next 24 hours. You will not feel this process.  Your urine will not change color.    Drink at least 4 extra glasses of water or juice today (unless your doctor  has restricted your fluids). This reduces the stress on your kidneys.  You may take your regular medicines.    If you are on dialysis: It is best to have dialysis today.    If you have a reaction: Most reactions happen right away. If you have  any new symptoms after leaving the hospital (such as hives or swelling),  call your hospital at the correct number below. Or call your family doctor.  If you have breathing distress or wheezing, call 911.    Special instructions: ***    I have read and understand the above information.    Signature:______________________________________ Date:___________    Staff:__________________________________________ Date:___________     Time:__________    [place] [facility] Departments:    ___[place]: [phone]  ___[facility]: [phone]  ___[place]: [phone] ___[facility]: [phone]  ___[facility]: [phone]  ___[facility]: [phone]  ___[phone]  ___[facility]: [phone]  ___[phone]

## 2018-08-30 ENCOUNTER — MYC MEDICAL ADVICE (OUTPATIENT)
Dept: NEUROLOGY | Facility: CLINIC | Age: 83
End: 2018-08-30

## 2018-09-05 NOTE — TELEPHONE ENCOUNTER
Spoke with patient about Images being pushed through PACS to [facility]. Report was faxed this morning.     Ranjith Jernigan MA

## 2018-09-13 ENCOUNTER — OFFICE VISIT (OUTPATIENT)
Dept: NEUROLOGY | Facility: CLINIC | Age: 83
End: 2018-09-13
Payer: MEDICARE

## 2018-09-13 VITALS — SYSTOLIC BLOOD PRESSURE: 130 MMHG | OXYGEN SATURATION: 100 % | HEART RATE: 110 BPM | DIASTOLIC BLOOD PRESSURE: 66 MMHG

## 2018-09-13 DIAGNOSIS — M75.121 COMPLETE TEAR OF RIGHT ROTATOR CUFF: ICD-10-CM

## 2018-09-13 DIAGNOSIS — G54.5 PARSONAGE-TURNER SYNDROME: Primary | ICD-10-CM

## 2018-09-13 PROBLEM — M54.2 NECK PAIN, CHRONIC: Status: ACTIVE | Noted: 2017-06-26

## 2018-09-13 PROBLEM — G89.29 NECK PAIN, CHRONIC: Status: ACTIVE | Noted: 2017-06-26

## 2018-09-13 PROBLEM — I48.91 ATRIAL FIBRILLATION (H): Status: ACTIVE | Noted: 2018-09-13

## 2018-09-13 PROBLEM — M75.111 INCOMPLETE TEAR OF RIGHT ROTATOR CUFF: Status: ACTIVE | Noted: 2018-05-18

## 2018-09-13 PROBLEM — M25.519 SHOULDER PAIN: Status: ACTIVE | Noted: 2018-06-25

## 2018-09-13 PROBLEM — M75.01 ADHESIVE CAPSULITIS OF RIGHT SHOULDER: Status: ACTIVE | Noted: 2018-05-18

## 2018-09-13 PROBLEM — E87.1 HYPONATREMIA: Status: ACTIVE | Noted: 2018-06-22

## 2018-09-13 PROBLEM — I10 ESSENTIAL HYPERTENSION: Status: ACTIVE | Noted: 2018-09-12

## 2018-09-13 PROBLEM — E07.9 DISORDER OF THYROID: Status: ACTIVE | Noted: 2018-09-13

## 2018-09-13 PROBLEM — F51.01 PRIMARY INSOMNIA: Status: ACTIVE | Noted: 2018-09-12

## 2018-09-13 RX ORDER — LIDOCAINE 50 MG/G
PATCH TOPICAL
Qty: 30 PATCH | Refills: 1 | Status: SHIPPED | OUTPATIENT
Start: 2018-09-13 | End: 2018-12-17

## 2018-09-13 ASSESSMENT — ENCOUNTER SYMPTOMS
NECK PAIN: 0
MYALGIAS: 1
MUSCLE WEAKNESS: 0
ARTHRALGIAS: 0
JOINT SWELLING: 0
BACK PAIN: 0
STIFFNESS: 1
MUSCLE CRAMPS: 0

## 2018-09-13 ASSESSMENT — PAIN SCALES - GENERAL: PAINLEVEL: MILD PAIN (2)

## 2018-09-13 NOTE — MR AVS SNAPSHOT
After Visit Summary   9/13/2018    Stanislav Lundy    MRN: [id number]           Patient Information     Date Of Birth          9/7/1934        Visit Information        Provider Department      9/13/2018 12:30 PM Jennifer Villa MD [facility] Neurology        Today's Diagnoses     Parsonage-Arteaga syndrome    -  1    Complete tear of right rotator cuff           Follow-ups after your visit        Follow-up notes from your care team     Return in about 3 months (around 12/17/2018), or if symptoms worsen or fail to improve.      Who to contact     Please call your clinic at [phone] to:    Ask questions about your health    Make or cancel appointments    Discuss your medicines    Learn about your test results    Speak to your doctor            Additional Information About Your Visit        [employer] Information     [facility] gives you secure access to your electronic health record. If you see a primary care provider, you can also send messages to your care team and make appointments. If you have questions, please call your primary care clinic.  If you do not have a primary care provider, please call [phone] and they will assist you.      [facility] is an electronic gateway that provides easy, online access to your medical records. With [facility], you can request a clinic appointment, read your test results, renew a prescription or communicate with your care team.     To access your existing account, please contact your [facility] or call [phone] for assistance.        Care EveryWhere ID     This is your Care EveryWhere ID. This could be used by other organizations to access your [facility] medical records  [id number]        Your Vitals Were     Pulse Pulse Oximetry                110 100%           Blood Pressure from Last 3 Encounters:   09/13/18 130/66   08/16/18 134/69   08/05/05 152/74    Weight from Last 3 Encounters:   08/16/18 67.1 kg (148 lb)    08/05/05 79.4 kg (175 lb)   07/18/03 73.9 kg (163 lb)              Today, you had the following     No orders found for display         Today's Medication Changes          These changes are accurate as of 9/13/18 12:55 PM.  If you have any questions, ask your nurse or doctor.               Start taking these medicines.        Dose/Directions    lidocaine 5 % Patch   Commonly known as:  LIDODERM   Used for:  Parsonage-Arteaga syndrome   Started by:  Jennifer Villa MD        Wear up to 3 patches for 12 hours and then take 12 hours off   Quantity:  30 patch   Refills:  1            Where to get your medicines      Some of these will need a paper prescription and others can be bought over the counter.  Ask your nurse if you have questions.     Bring a paper prescription for each of these medications     lidocaine 5 % Patch                Primary Care Provider Office Phone # Fax #    Kurtis Masterson [phone]       [facility] [address] [place] [address]        Equal Access to Services     ANGELIKA DIEZ : Hadii miguel ku hadasho Soomaali, waaxda luqadaha, qaybta kaalmada adeegyada, waxay idiin hayagn reagan kelley . So [facility] [phone].    ATENCIÓN: Si habla español, tiene a lu disposición servicios gratuitos de asistencia lingüística. Llame al [phone].    We comply with applicable federal civil rights laws and [place] laws. We do not discriminate on the basis of race, color, national origin, age, disability, sex, sexual orientation, or gender identity.            Thank you!     Thank you for choosing [facility] NEUROLOGY  for your care. Our goal is always to provide you with excellent care. Hearing back from our patients is one way we can continue to improve our services. Please take a few minutes to complete the written survey that you may receive in the mail after your visit with us. Thank you!             Your Updated Medication List - Protect others  around you: Learn how to safely use, store and throw away your medicines at www.disposemymeds.org.          This list is accurate as of 9/13/18 12:55 PM.  Always use your most recent med list.                   Brand Name Dispense Instructions for use Diagnosis    ALTACE 2.5 MG capsule   Generic drug:  ramipril     60    1 CAPSULE EVERY DAY        amLODIPine 5 MG tablet    NORVASC     Take 5 mg by mouth        cyanocobalamin 1000 MCG tablet    vitamin  B-12     4 times weekly        ELIQUIS 5 MG tablet   Generic drug:  apixaban ANTICOAGULANT           gabapentin 100 MG capsule    NEURONTIN    90 capsule    Take 1 capsule (100 mg) by mouth 3 times daily    Parsonage-Arteaga syndrome       lidocaine 5 % Patch    LIDODERM    30 patch    Wear up to 3 patches for 12 hours and then take 12 hours off    Parsonage-Arteaga syndrome       LIPITOR 10 MG tablet   Generic drug:  atorvastatin     90    1 tab PO QD (Once per day)        losartan 25 MG tablet    COZAAR     Take 25 mg by mouth daily        NASACORT 55 MCG/ACT Aers   Generic drug:  TRIAMCINOLONE ACETONIDE(NASAL)     1    2 SPRAYS IN EACH NOSTRIL QD (Once per day)        * nitroGLYcerin 0.4 MG sublingual tablet    NITROSTAT    25    1 sublingually prn chest discomfort, may repeat each five minutes if needed to a maximum of 3 consecutive tablets        * nitroGLYcerin 0.4 MG sublingual tablet    NITROSTAT     Place 0.4 mg under the tongue        OMEGA 3 PO           * TOPROL XL 25 MG 24 hr tablet   Generic drug:  metoprolol succinate      1 TABLET DAILY    Coronary atherosclerosis of unspecified type of vessel, native or graft, Essential and other specified forms of tremor       * metoprolol succinate 25 MG 24 hr tablet    TOPROL-XL     Take 25 mg by mouth        * Notice:  This list has 4 medication(s) that are the same as other medications prescribed for you. Read the directions carefully, and ask your doctor or other care provider to review them with you.

## 2018-09-13 NOTE — LETTER
9/13/2018       RE: Stanislav Lundy  [address]  [place] [address]     Dear Colleague,    Thank you for referring your patient, Stanislav Lundy, to the [facility] NEUROLOGY at [facility]. Please see a copy of my visit note below.    [facility] Department of Neurology    Reason for visit: Return visit to discuss pain control    HPI:    Stanislav is an 84-year-old gentleman who initially presented to the clinic on August 16 for evaluation of right shoulder pain.  The patient was sent by orthopedics, Dr. Martini, who felt the pain might be out of proportion to these degree of shoulder injury.    The patient reports a history of severe pain in his right shoulder that preceded any possible injury.  He then developed weakness in his shoulder.  EMG performed at the same time as my visit on August 16 by Dr. Kessler demonstrated abnormalities that were felt to be consistent with a chronic or remote right brachial plexopathy predominantly affecting the upper trunk and to a lesser extent the middle trunk.    The patient had an MRI of the brachial plexus subsequently.  No evidence of any hyperintensities  or mass lesions were seen.  It was noted that he had a complete tear of the right right rotator cuff associated with muscle atrophy and inflammation and I did for this result over to his orthopedic surgeons at [facility].      Last time I saw the patient initiated him on gabapentin for pain control.  He was instructed to take 100 mg 3 times a day and then when he contacted me reporting no pain relief I instructed him to work the dose up to 200 mg 3 times a day.  He has been hesitant to push the dose up because of concern over side effects.  He really is scared of becoming dizzy and possibly falling over and injuring himself.  He thinks he might of had a mild amount of dizziness but his wife reports that it is more anxiety from possible side effects and  does not feel there has been any major side effects.  At this point time he is taking 100 mg in the morning, 100 mg midday and 200 mg at night and he is only been up on this dose for the past couple of days.    Impression/Recommendations:    1.  Brachial plexopathy  The patient reports pain without injury in his right shoulder followed by weakness most consistent with Parsonage-Arteaga syndrome.  At this point in time we just need to treat the pain and allow the symptoms to pass.  I encouraged the patient to continue to push the gabapentin dose up.  I think he could work all the way up to 300 mg 3 times a day slowly adding 100 mg each week.  Those instructions were discussed with him and he will update me via my chart if there are any problems.  I would like to see him back in December.    2.  Right rotator cuff complete tear    I have forwarded the MRI findings to Dr. Martini and defer to her judgment as to whether any further intervention is needed for this.  Considering patient's age and current pain status I suspect it will be watchful waiting at this point in time.    15 minutes with the patient over 50% counseling.      Again, thank you for allowing me to participate in the care of your patient.      Sincerely,    Jennifer Villa MD

## 2018-09-14 RX ORDER — GABAPENTIN 100 MG/1
200 CAPSULE ORAL 3 TIMES DAILY
Qty: 180 CAPSULE | Refills: 11 | Status: SHIPPED | OUTPATIENT
Start: 2018-09-14 | End: 2018-12-17

## 2018-09-14 ASSESSMENT — PATIENT HEALTH QUESTIONNAIRE - PHQ9: SUM OF ALL RESPONSES TO PHQ QUESTIONS 1-9: 1

## 2018-09-14 NOTE — PROGRESS NOTES
[facility] Department of Neurology    Reason for visit: Return visit to discuss pain control    HPI:    Stanislav is an 84-year-old gentleman who initially presented to the clinic on August 16 for evaluation of right shoulder pain.  The patient was sent by orthopedics, Dr. Martini, who felt the pain might be out of proportion to these degree of shoulder injury.    The patient reports a history of severe pain in his right shoulder that preceded any possible injury.  He then developed weakness in his shoulder.  EMG performed at the same time as my visit on August 16 by Dr. Kessler demonstrated abnormalities that were felt to be consistent with a chronic or remote right brachial plexopathy predominantly affecting the upper trunk and to a lesser extent the middle trunk.    The patient had an MRI of the brachial plexus subsequently.  No evidence of any hyperintensities  or mass lesions were seen.  It was noted that he had a complete tear of the right right rotator cuff associated with muscle atrophy and inflammation and I did for this result over to his orthopedic surgeons at [facility].      Last time I saw the patient initiated him on gabapentin for pain control.  He was instructed to take 100 mg 3 times a day and then when he contacted me reporting no pain relief I instructed him to work the dose up to 200 mg 3 times a day.  He has been hesitant to push the dose up because of concern over side effects.  He really is scared of becoming dizzy and possibly falling over and injuring himself.  He thinks he might of had a mild amount of dizziness but his wife reports that it is more anxiety from possible side effects and does not feel there has been any major side effects.  At this point time he is taking 100 mg in the morning, 100 mg midday and 200 mg at night and he is only been up on this dose for the past couple of days.    Impression/Recommendations:    1.  Brachial plexopathy  The patient reports pain  without injury in his right shoulder followed by weakness most consistent with Parsonage-Arteaga syndrome.  At this point in time we just need to treat the pain and allow the symptoms to pass.  I encouraged the patient to continue to push the gabapentin dose up.  I think he could work all the way up to 300 mg 3 times a day slowly adding 100 mg each week.  Those instructions were discussed with him and he will update me via my chart if there are any problems.  I would like to see him back in December.    2.  Right rotator cuff complete tear    I have forwarded the MRI findings to Dr. Martini and defer to her judgment as to whether any further intervention is needed for this.  Considering patient's age and current pain status I suspect it will be watchful waiting at this point in time.    15 minutes with the patient over 50% counseling.    Jennifer Villa MD [facility]  Department of Neurology  Pager [phone]        Answers for HPI/ROS submitted by the patient on 9/13/2018   General Symptoms: No  Skin Symptoms: No  HENT Symptoms: No  EYE SYMPTOMS: No  HEART SYMPTOMS: No  LUNG SYMPTOMS: No  INTESTINAL SYMPTOMS: No  URINARY SYMPTOMS: No  REPRODUCTIVE SYMPTOMS: No  SKELETAL SYMPTOMS: Yes  BLOOD SYMPTOMS: No  NERVOUS SYSTEM SYMPTOMS: No  MENTAL HEALTH SYMPTOMS: No  Back pain: No  Muscle aches: Yes  Neck pain: No  Swollen joints: No  Joint pain: No  Bone pain: No  Muscle cramps: No  Muscle weakness: No  Joint stiffness: Yes  Bone fracture: No

## 2018-10-04 ENCOUNTER — TELEPHONE (OUTPATIENT)
Dept: NEUROLOGY | Facility: CLINIC | Age: 83
End: 2018-10-04

## 2018-10-04 NOTE — TELEPHONE ENCOUNTER
Central Prior Authorization Team   [phone]    PA Initiation    Medication: lidocaine patch 5%  Insurance Company: [employer] - Phone [phone] Fax [phone]  Pharmacy Filling the Rx: [id number] - [place], [place] - [address]  Filling Pharmacy Phone: [phone]  Filling Pharmacy Fax:    Start Date: 10/4/2018

## 2018-10-04 NOTE — TELEPHONE ENCOUNTER
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      Pharmacy Information (if different than what is on RX)  [facility] [place]  Phone:  ?

## 2018-10-09 NOTE — TELEPHONE ENCOUNTER
PRIOR AUTHORIZATION DENIED    Medication: lidocaine patch 5%-DENIED    Denial Date: 10/4/2018    Denial Rational: Criteria Not Met. Lidoderm patches are only covered with the diagnosis of post-herpetic neuralgia, diabetic neuropathy, or cancer related pain. It will not be covered for the associated diagnosis.           Appeal Information: If provider would like to appeal please provide a letter of medical necessity stating why formulary alternatives would not be clinically appropriate for patient and route back to the PA team. Lidoderm patches are only covered with the diagnosis of post-herpetic neuralgia, diabetic neuropathy, or cancer related pain. It will not be covered for the associated diagnosis.

## 2018-11-07 DIAGNOSIS — G54.5 PARSONAGE-TURNER SYNDROME: ICD-10-CM

## 2018-11-08 ENCOUNTER — TELEPHONE (OUTPATIENT)
Dept: NEUROLOGY | Facility: CLINIC | Age: 83
End: 2018-11-08

## 2018-11-08 NOTE — TELEPHONE ENCOUNTER
Prior Authorization Retail Medication Request    Medication/Dose: LIDOCAINE 5% PATCH  ICD code (if different than what is on RX):  M15.9  Previously Tried and Failed:  See chart  Rationale:        Pharmacy Information (if different than what is on RX)  Name:  [facility]  Phone:  [phone]

## 2018-11-09 NOTE — TELEPHONE ENCOUNTER
This P/A has already been denied by [employer] [facility] (ID# [id number]) See encounter from 10/04/18. Patient also has [place] (ID# [id number]). Will try that.    Central Prior Authorization Team   Phone: [phone]    PA Initiation    Medication: LIDOCAINE 5% PATCH  Insurance Company: [employer] [place] - Phone [phone] Fax [phone]  Pharmacy [facility]illing the Rx[id number] - [place], [place] - [address]  Filling Pharmacy Phone: [phone]  Filling Pharmacy Fax: [phone]  Start Date: 11/9/2018

## 2018-11-12 NOTE — TELEPHONE ENCOUNTER
This P/A has already been denied by [employer] (ID# [id number]) See encounter from 10/04/18.   Patient also has [place] (ID# [id number]). This is also denied.    PRIOR AUTHORIZATION DENIED    Medication: LIDOCAINE 5% PATCH - P/A DENIED    Denial Date: 11/11/2018    Denial Rational: Not a covered benefit under this plan

## 2018-12-17 ENCOUNTER — OFFICE VISIT (OUTPATIENT)
Dept: NEUROLOGY | Facility: CLINIC | Age: 83
End: 2018-12-17
Payer: MEDICARE

## 2018-12-17 VITALS
HEART RATE: 82 BPM | DIASTOLIC BLOOD PRESSURE: 65 MMHG | HEIGHT: 69 IN | SYSTOLIC BLOOD PRESSURE: 122 MMHG | BODY MASS INDEX: 21.86 KG/M2 | OXYGEN SATURATION: 97 %

## 2018-12-17 DIAGNOSIS — G54.5 PARSONAGE-TURNER SYNDROME: ICD-10-CM

## 2018-12-17 DIAGNOSIS — M75.121 COMPLETE TEAR OF RIGHT ROTATOR CUFF: Primary | ICD-10-CM

## 2018-12-17 RX ORDER — GABAPENTIN 100 MG/1
300 CAPSULE ORAL 3 TIMES DAILY
Qty: 270 CAPSULE | Refills: 11 | Status: SHIPPED | OUTPATIENT
Start: 2018-12-17 | End: 2019-06-11

## 2018-12-17 ASSESSMENT — PAIN SCALES - GENERAL: PAINLEVEL: MODERATE PAIN (4)

## 2018-12-17 NOTE — NURSING NOTE
Chief Complaint   Patient presents with     RECHECK     [facility] RETURN 3 MO F/U     Patient taking CBD oil against Dr. Villa's orders.    Odalys Chang, EMT

## 2018-12-17 NOTE — LETTER
"12/17/2018       RE: Stanislav Lundy  [address]  [facility] [address]     Dear Colleague,    Thank you for referring your patient, Stanislav Lundy, to the [facility] NEUROLOGY at [facility]. Please see a copy of my visit note below.    [facility] Dept of Neurology    Reason for visit: Follow up for pain    HPI:  Stanislav Lundy is an 84-year-old gentleman with a complete tear of the right rotator cuff.  The patient was experiencing significant pain in his shoulder that was not explained by his rotator cuff injury thus prompting orthopedic surgery to refer to neurology.  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    The patient has been taking gabapentin for pain management.  He has not continue to titrate the medication as instructed due to concern over side effects.  He is currently taking 200 mg 3 times a day.  He does not particularly have the side effects he just worries about getting them.  He spends part of his year in [place] and has been buying CBD oil to take at night and thinks this is helpful for his sleep.    The patient is working with physical therapy and continues to try and work on shoulder range of motion to avoid a frozen shoulder.      PE  /65 (BP Location: Right arm, Patient Position: Chair, Cuff Size: Adult Regular)   Pulse 82   Ht 1.753 m (5' 9\")   SpO2 97%   BMI 21.86 kg/m     Appearance: The patient is well-groomed and cooperative with examination.  He is in no acute distress  Neurological examination: The patient has normal strength distally in the right lower extremity.  More proximally the patient is unable to elevate his arm away from his body or externally rotate the arm.  Strength would be graded 2/5.  The patient also reports " substantial pain with elevation of the arm above 45 degrees.  Sensation to light touch is normal in the distal upper extremity.    Impression/Recommendations    1.  Parsonage-Arteaga syndrome  2.  Complete right rotator cuff tear    The patient has weakness is most likely is related to a combination of rotator cuff injury plus Parsonage-Arteaga.  The pain is more likely to be Parsonage-Arteaga although I am concerned that he will get adhesive capsulitis if he does not follow through with his range of motion exercises.    I have recommended he increase gabapentin to 300 mg 3 times daily.  If he does not tolerate this increase we can try switching to Lyrica.    The patient will follow up with me in 6 months time.  He will continue to follow with orthopedic surgery at their recommendations.    25 minutes with the patient over 50% counseling.    Jennifer Villa MD [facility]  Department of Neurology  Pager [phone]

## 2018-12-18 NOTE — PROGRESS NOTES
"[facility] Dept of Neurology    Reason for visit: Follow up for pain    HPI:  Stanislav Lundy is an 84-year-old gentleman with a complete tear of the right rotator cuff.  The patient was experiencing significant pain in his shoulder that was not explained by his rotator cuff injury thus prompting orthopedic surgery to refer to neurology.  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    The patient has been taking gabapentin for pain management.  He has not continue to titrate the medication as instructed due to concern over side effects.  He is currently taking 200 mg 3 times a day.  He does not particularly have the side effects he just worries about getting them.  He spends part of his year in [place] and has been buying CBD oil to take at night and thinks this is helpful for his sleep.    The patient is working with physical therapy and continues to try and work on shoulder range of motion to avoid a frozen shoulder.      PE  /65 (BP Location: Right arm, Patient Position: Chair, Cuff Size: Adult Regular)   Pulse 82   Ht 1.753 m (5' 9\")   SpO2 97%   BMI 21.86 kg/m    Appearance: The patient is well-groomed and cooperative with examination.  He is in no acute distress  Neurological examination: The patient has normal strength distally in the right lower extremity.  More proximally the patient is unable to elevate his arm away from his body or externally rotate the arm.  Strength would be graded 2/5.  The patient also reports substantial pain with elevation of the arm above 45 degrees.  Sensation to light touch is normal in the distal upper extremity.    Impression/Recommendations    1.  Parsonage-Arteaga syndrome  2.  Complete right rotator cuff tear    The patient has weakness is most likely is related to a combination " of rotator cuff injury plus Parsonage-Arteaga.  The pain is more likely to be Parsonage-Arteaga although I am concerned that he will get adhesive capsulitis if he does not follow through with his range of motion exercises.    I have recommended he increase gabapentin to 300 mg 3 times daily.  If he does not tolerate this increase we can try switching to Lyrica.    The patient will follow up with me in 6 months time.  He will continue to follow with orthopedic surgery at their recommendations.    25 minutes with the patient over 50% counseling.    Jennifer Villa MD [facility]  Department of Neurology  Pager [phone]

## 2019-06-11 ENCOUNTER — OFFICE VISIT (OUTPATIENT)
Dept: NEUROLOGY | Facility: CLINIC | Age: 84
End: 2019-06-11
Payer: MEDICARE

## 2019-06-11 VITALS
BODY MASS INDEX: 22.51 KG/M2 | DIASTOLIC BLOOD PRESSURE: 88 MMHG | HEIGHT: 69 IN | HEART RATE: 62 BPM | OXYGEN SATURATION: 99 % | WEIGHT: 152 LBS | SYSTOLIC BLOOD PRESSURE: 133 MMHG

## 2019-06-11 DIAGNOSIS — G54.5 PARSONAGE-TURNER SYNDROME: Primary | ICD-10-CM

## 2019-06-11 DIAGNOSIS — M25.511 CHRONIC RIGHT SHOULDER PAIN: ICD-10-CM

## 2019-06-11 DIAGNOSIS — G89.29 CHRONIC RIGHT SHOULDER PAIN: ICD-10-CM

## 2019-06-11 PROBLEM — J32.9 CHRONIC SINUSITIS: Status: ACTIVE | Noted: 2019-05-20

## 2019-06-11 ASSESSMENT — MIFFLIN-ST. JEOR: SCORE: 1369.85

## 2019-06-11 ASSESSMENT — PAIN SCALES - GENERAL: PAINLEVEL: SEVERE PAIN (7)

## 2019-06-11 NOTE — PROGRESS NOTES
[facility]    Stanislav Lundy MRN# [id number]   Age: 84 year old YOB: 1934     Requesting physician: Kurtis Davila            Assessment and Plan:   Assessment:  1.  Parsonage-Arteaga syndrome  2.  Right rotator cuff complete tear     Plan:  The patient is doing well from a perspective of Parsonage-Arteaga syndrome and has recovered some strength.  He will be somewhat limited due to his known rotator cuff complete tear.  The good news is his pain is under much better control and I agree with his decision to completely taper the gabapentin.    Follow-up with neurology will be on an as-needed basis.  15 minutes spent with the patient over 50% counseling    Jennifer Villa MD [facility]  Department of Neurology  Pager [phone]                       History of Present Illness:   CC: Return visit    Stanislva is an 84-year-old gentleman with a history of a right shoulder rotator cuff tear that out of proportion pain and weakness that is felt to most likely represent Parsonage-Arteaga syndrome.    The patient returns today for six-month recheck of his symptoms.  He reports that the pain is under much better control and he has been weaning the gabapentin without difficulty.  He has a few days left before complete cessation.  He reports that his shoulder strength is returning but would like to participate in some physical therapy to continue to work on his shoulder strength improvement.    The patient mentions that he recently took a rather severe fall.  He apparently was standing in front of some commercial garage doors that opened unexpectedly and knocked him over.  He has a large hematoma on his hip and a bruise on his right cheek as well as some stitches that are healing well over his right eyebrow.    His wife mentions that his legs have been weak but he is not sure whether this is related to pain from the hematoma they will continue to observe.  They know they can come back to see  "me if they think it continues to be a problem.           Physical Exam:   /88 (BP Location: Left arm, Patient Position: Sitting, Cuff Size: Adult Regular)   Pulse 62   Ht 1.753 m (5' 9\")   Wt 68.9 kg (152 lb)   SpO2 99%   BMI 22.45 kg/m    General appearance: The patient is well-groomed and cooperative with examination.  Neurological examination: The patient continues to have weakness in the right upper extremity.  He seems to be in a lot less pain with strength testing.  Deltoid strength testing 2/5, biceps and triceps strength testing 5/5, wrist extension, wrist flexion, finger extension and finger flexion 5/5.  Laceration healing well over the right eye, chronic bruising seen over the right cheek.  The patient stands with pushing up with his hands from a chair.  He is able to walk stably.         Data:   All laboratory data reviewed  All imaging studies reviewed by me             DATA for DOCUMENTATION:         Past Medical History:     Patient Active Problem List   Diagnosis     Coronary atherosclerosis     Hyperlipidemia     Primary osteoarthritis involving multiple joints     Chronic rhinitis     Adhesive capsulitis of right shoulder     Atrial fibrillation (H)     Essential hypertension     Pure hypercholesterolemia     Hyponatremia     Incomplete tear of right rotator cuff     Multilevel degenerative disc disease     Neck pain, chronic     New onset a-fib (H)     Shoulder pain     Primary insomnia     Disorder of thyroid     Parsonage-Arteaga syndrome     Complete tear of right rotator cuff     Chronic sinusitis     Past Medical History:   Diagnosis Date     Chronic rhinitis     Abstracted 06/12/02     Coronary atherosclerosis of unspecified type of vessel, native or graft 1990     Generalized osteoarthrosis, unspecified site     especially low back     Other and unspecified hyperlipidemia        Also see scanned health assessment forms.       Past Surgical History:     Past Surgical History: "   Procedure Laterality Date     C NONSPECIFIC PROCEDURE      CABG, 3-vessel     C NONSPECIFIC PROCEDURE      Partial thyroidectomy     C NONSPECIFIC PROCEDURE  ,     S/P bilateral rotator cuff  , bilateral again in      C NONSPECIFIC PROCEDURE      S/P Right forearm fracture secondary to fall with a re-do graft from hip  Abstracted 02     C NONSPECIFIC PROCEDURE      S/P T&A     C NONSPECIFIC PROCEDURE      Dupuytren's contracture     C NONSPECIFIC PROCEDURE  ~    Hardware removed from right forearm     HC COLONOSCOPY THRU STOMA, DIAGNOSTIC  2004    Normal     HC FLEX SIGMOIDOSCOPY W/WO MIKEL SPEC BY BRUSH/WASH  2001    Normal to 60 cm            Social History:     Social History     Socioeconomic History     Marital status:      Spouse name: Saray     Number of children: 2     Years of education: Not on file     Highest education level: Not on file   Occupational History     Occupation: [employer]     Employer: RETIRED   Social Needs     Financial resource strain: Not on file     Food insecurity:     Worry: Not on file     Inability: Not on file     Transportation needs:     Medical: Not on file     Non-medical: Not on file   Tobacco Use     Smoking status: Former Smoker     Packs/day: 1.00     Types: Cigarettes     Last attempt to quit: 1978     Years since quittin.4     Smokeless tobacco: Never Used   Substance and Sexual Activity     Alcohol use: No     Drug use: No     Sexual activity: Not Currently   Lifestyle     Physical activity:     Days per week: Not on file     Minutes per session: Not on file     Stress: Not on file   Relationships     Social connections:     Talks on phone: Not on file     Gets together: Not on file     Attends Pentecostalism service: Not on file     Active member of club or organization: Not on file     Attends meetings of clubs or organizations: Not on file     Relationship status: Not on file     Intimate  partner violence:     Fear of current or ex partner: Not on file     Emotionally abused: Not on file     Physically abused: Not on file     Forced sexual activity: Not on file   Other Topics Concern      Service No     Blood Transfusions No     Caffeine Concern Not Asked     Occupational Exposure Not Asked     Hobby Hazards Not Asked     Sleep Concern Not Asked     Stress Concern Not Asked     Weight Concern Not Asked     Special Diet Not Asked     Back Care Not Asked     Exercise Not Asked     Bike Helmet Not Asked     Seat Belt Not Asked     Self-Exams Not Asked   Social History Narrative    Semi-retired .              Family History:     Family History   Problem Relation Age of Onset     C.A.D. Mother          age 90     Hypertension Mother      Lipids Mother      C.A.D. Father          age 90, first MI in his mid 60's     Hypertension Father      Lipids Father      C.A.D. Brother          age 70 of heart problems and Prostate CA     Family History Negative Brother         Born 8     Family History Negative Brother         Born      Family History Negative Sister         Born , has COPD     Family History Negative Sister         Born , has COPD     Family History Negative Brother          in MVA age 19     Cancer Brother         Born [birth year], has had melanoma resected for apparent cure            Medications:     Current Outpatient Medications   Medication Sig     ALTACE 2.5 MG OR CAPS 1 CAPSULE EVERY DAY     amLODIPine (NORVASC) 5 MG tablet Take 2.5 mg by mouth      apixaban ANTICOAGULANT (ELIQUIS) 5 MG tablet      cyanocobalamin (VITAMIN  B-12) 1000 MCG tablet 4 times weekly     LIPITOR 10 MG OR TABS 1 tab PO QD (Once per day)     losartan (COZAAR) 25 MG tablet Take 25 mg by mouth daily     metoprolol succinate (TOPROL-XL) 25 MG 24 hr tablet Take 25 mg by mouth     NASACORT 55 MCG/ACT NA AERS 2 SPRAYS IN EACH NOSTRIL QD (Once per day)     nitroGLYcerin  (NITROSTAT) 0.4 MG sublingual tablet Place 0.4 mg under the tongue     NITROGLYCERIN 0.4 MG SL SUBL 1 sublingually prn chest discomfort, may repeat each five minutes if needed to a maximum of 3 consecutive tablets     Omega-3 Fatty Acids (OMEGA 3 PO)      TOPROL XL 25 MG OR TB24 1 TABLET DAILY     No current facility-administered medications for this visit.               Review of Systems:   A comprehensive 10 point review of systems (constitutional, ENT, cardiac, peripheral vascular, lymphatic, respiratory, GI, , Musculoskeletal, skin, Neurological) was performed and found to be negative except as described in this note.     See intake form completed by patient  Answers for HPI/ROS submitted by the patient on 6/9/2019   General Symptoms: No  Skin Symptoms: No  HENT Symptoms: No  EYE SYMPTOMS: No  HEART SYMPTOMS: No  LUNG SYMPTOMS: No  INTESTINAL SYMPTOMS: No  URINARY SYMPTOMS: No  REPRODUCTIVE SYMPTOMS: No  SKELETAL SYMPTOMS: No  BLOOD SYMPTOMS: No  NERVOUS SYSTEM SYMPTOMS: No  MENTAL HEALTH SYMPTOMS: No

## 2019-06-11 NOTE — LETTER
6/11/2019       RE: Stanislav Lundy  [address]  [facility] [address]     Dear Colleague,    Thank you for referring your patient, Stanislav Lundy, to the [facility] NEUROLOGY at [facility]. Please see a copy of my visit note below.    [facility]    Stanislav Lundy MRN# [id number]   Age: 84 year old YOB: 1934     Requesting physician: Kurtis Davila            Assessment and Plan:   Assessment:  1.  Parsonage-Arteaga syndrome  2.  Right rotator cuff complete tear     Plan:  The patient is doing well from a perspective of Parsonage-Arteaga syndrome and has recovered some strength.  He will be somewhat limited due to his known rotator cuff complete tear.  The good news is his pain is under much better control and I agree with his decision to completely taper the gabapentin.    Follow-up with neurology will be on an as-needed basis.  15 minutes spent with the patient over 50% counseling    Jennifer Villa MD [facility]  Department of Neurology  Pager [phone]                       History of Present Illness:   CC: Return visit    Stanislav is an 84-year-old gentleman with a history of a right shoulder rotator cuff tear that out of proportion pain and weakness that is felt to most likely represent Parsonage-Arteaga syndrome.    The patient returns today for six-month recheck of his symptoms.  He reports that the pain is under much better control and he has been weaning the gabapentin without difficulty.  He has a few days left before complete cessation.  He reports that his shoulder strength is returning but would like to participate in some physical therapy to continue to work on his shoulder strength improvement.    The patient mentions that he recently took a rather severe fall.  He apparently was standing in front of some commercial garage doors that opened unexpectedly and knocked him over.  He has a large hematoma on his hip and  "a bruise on his right cheek as well as some stitches that are healing well over his right eyebrow.    His wife mentions that his legs have been weak but he is not sure whether this is related to pain from the hematoma they will continue to observe.  They know they can come back to see me if they think it continues to be a problem.           Physical Exam:   /88 (BP Location: Left arm, Patient Position: Sitting, Cuff Size: Adult Regular)   Pulse 62   Ht 1.753 m (5' 9\")   Wt 68.9 kg (152 lb)   SpO2 99%   BMI 22.45 kg/m     General appearance: The patient is well-groomed and cooperative with examination.  Neurological examination: The patient continues to have weakness in the right upper extremity.  He seems to be in a lot less pain with strength testing.  Deltoid strength testing 2/5, biceps and triceps strength testing 5/5, wrist extension, wrist flexion, finger extension and finger flexion 5/5.  Laceration healing well over the right eye, chronic bruising seen over the right cheek.  The patient stands with pushing up with his hands from a chair.  He is able to walk stably.         Data:   All laboratory data reviewed  All imaging studies reviewed by me             DATA for DOCUMENTATION:         Past Medical History:     Patient Active Problem List   Diagnosis     Coronary atherosclerosis     Hyperlipidemia     Primary osteoarthritis involving multiple joints     Chronic rhinitis     Adhesive capsulitis of right shoulder     Atrial fibrillation (H)     Essential hypertension     Pure hypercholesterolemia     Hyponatremia     Incomplete tear of right rotator cuff     Multilevel degenerative disc disease     Neck pain, chronic     New onset a-fib (H)     Shoulder pain     Primary insomnia     Disorder of thyroid     Parsonage-Arteaga syndrome     Complete tear of right rotator cuff     Chronic sinusitis     Past Medical History:   Diagnosis Date     Chronic rhinitis     Abstracted 06/12/02     Coronary " atherosclerosis of unspecified type of vessel, native or graft      Generalized osteoarthrosis, unspecified site     especially low back     Other and unspecified hyperlipidemia        Also see scanned health assessment forms.       Past Surgical History:     Past Surgical History:   Procedure Laterality Date     C NONSPECIFIC PROCEDURE      CABG, 3-vessel     C NONSPECIFIC PROCEDURE      Partial thyroidectomy     C NONSPECIFIC PROCEDURE  ,     S/P bilateral rotator cuff  , bilateral again in      C NONSPECIFIC PROCEDURE      S/P Right forearm fracture secondary to fall with a re-do graft from hip  Abstracted 02     C NONSPECIFIC PROCEDURE      S/P T&A     C NONSPECIFIC PROCEDURE      Dupuytren's contracture     C NONSPECIFIC PROCEDURE  ~    Hardware removed from right forearm     HC COLONOSCOPY THRU STOMA, DIAGNOSTIC  2004    Normal     HC FLEX SIGMOIDOSCOPY W/WO MIKEL SPEC BY BRUSH/WASH  2001    Normal to 60 cm            Social History:     Social History     Socioeconomic History     Marital status:      Spouse name: Saray     Number of children: 2     Years of education: Not on file     Highest education level: Not on file   Occupational History     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     Employer: RETIRED   Social Needs     Financial resource strain: Not on file     Food insecurity:     Worry: Not on file     Inability: Not on file     Transportation needs:     Medical: Not on file     Non-medical: Not on file   Tobacco Use     Smoking status: Former Smoker     Packs/day: 1.00     Types: Cigarettes     Last attempt to quit: 1978     Years since quittin.4     Smokeless tobacco: Never Used   Substance and Sexual Activity     Alcohol use: No     Drug use: No     Sexual activity: Not Currently   Lifestyle     Physical activity:     Days per week: Not on file     Minutes per session: Not on file     Stress: Not on file   Relationships     Social  connections:     Talks on phone: Not on file     Gets together: Not on file     Attends Roman [employer] service: Not on file     Active member of club or organization: Not on file     Attends meetings of clubs or organizations: Not on file     Relationship status: Not on file     Intimate partner violence:     Fear of current or ex partner: Not on file     Emotionally abused: Not on file     Physically abused: Not on file     Forced sexual activity: Not on file   Other Topics Concern      Service No     Blood Transfusions No     Caffeine Concern Not Asked     Occupational Exposure Not Asked     Hobby Hazards Not Asked     Sleep Concern Not Asked     Stress Concern Not Asked     Weight Concern Not Asked     Special Diet Not Asked     Back Care Not Asked     Exercise Not Asked     Bike Helmet Not Asked     Seat Belt Not Asked     Self-Exams Not Asked   Social History Narrative    Semi-retired .              Family History:     Family History   Problem Relation Age of Onset     C.A.D. Mother          age 90     Hypertension Mother      Lipids Mother      C.A.D. Father          age 90, first MI in his mid 60's     Hypertension Father      Lipids Father      C.A.D. Brother          age 70 of heart problems and Prostate CA     Family History Negative Brother         Born 1928     Family History Negative Brother         Born 1941     Family History Negative Sister         Born 1930, has COPD     Family History Negative Sister         Born , has COPD     Family History Negative Brother          in MVA age 19     Cancer Brother         Born [birth year], has had melanoma resected for apparent cure            Medications:     Current Outpatient Medications   Medication Sig     ALTACE 2.5 MG OR CAPS 1 CAPSULE EVERY DAY     amLODIPine (NORVASC) 5 MG tablet Take 2.5 mg by mouth      apixaban ANTICOAGULANT (ELIQUIS) 5 MG tablet      cyanocobalamin (VITAMIN  B-12) 1000 MCG tablet 4 times weekly      LIPITOR 10 MG OR TABS 1 tab PO QD (Once per day)     losartan (COZAAR) 25 MG tablet Take 25 mg by mouth daily     metoprolol succinate (TOPROL-XL) 25 MG 24 hr tablet Take 25 mg by mouth     NASACORT 55 MCG/ACT NA AERS 2 SPRAYS IN EACH NOSTRIL QD (Once per day)     nitroGLYcerin (NITROSTAT) 0.4 MG sublingual tablet Place 0.4 mg under the tongue     NITROGLYCERIN 0.4 MG SL SUBL 1 sublingually prn chest discomfort, may repeat each five minutes if needed to a maximum of 3 consecutive tablets     Omega-3 Fatty Acids (OMEGA 3 PO)      TOPROL XL 25 MG OR TB24 1 TABLET DAILY     No current facility-administered medications for this visit.               Review of Systems:   A comprehensive 10 point review of systems (constitutional, ENT, cardiac, peripheral vascular, lymphatic, respiratory, GI, , Musculoskeletal, skin, Neurological) was performed and found to be negative except as described in this note.     See intake form completed by patient    Again, thank you for allowing me to participate in the care of your patient.      Sincerely,    Jennifer Villa MD

## 2019-07-19 ENCOUNTER — TELEPHONE (OUTPATIENT)
Dept: NEUROLOGY | Facility: CLINIC | Age: 84
End: 2019-07-19

## 2019-07-19 NOTE — TELEPHONE ENCOUNTER
Spoke to patients wife and she confirmed that they can come in at 7am on 7/22/2019 (Monday) to see Dr. Villa. This appointment was approved by Dr. Villa

## 2019-07-19 NOTE — TELEPHONE ENCOUNTER
Left voicemail for patient to call back to discuss if opening in Dr. Villa's schedule will work for them.    8/06/2019 at 0800 or 1000. If these appts don't work then to discuss another time. I sent a message about this via [employer] as well. Awaiting response.

## 2019-07-20 ASSESSMENT — ENCOUNTER SYMPTOMS
DECREASED APPETITE: 0
PARALYSIS: 0
ARTHRALGIAS: 1
ALTERED TEMPERATURE REGULATION: 0
MUSCLE WEAKNESS: 1
HALLUCINATIONS: 0
WEIGHT LOSS: 0
HEADACHES: 0
TINGLING: 1
MYALGIAS: 1
POLYDIPSIA: 0
CHILLS: 0
POLYPHAGIA: 0
FEVER: 0
NECK PAIN: 0
MUSCLE CRAMPS: 1
WEIGHT GAIN: 0
SEIZURES: 0
NUMBNESS: 0
WEAKNESS: 1
BACK PAIN: 0
TREMORS: 1
FATIGUE: 1
MEMORY LOSS: 0
DISTURBANCES IN COORDINATION: 0
NIGHT SWEATS: 0
STIFFNESS: 0
INCREASED ENERGY: 0
SPEECH CHANGE: 0
DIZZINESS: 1

## 2019-07-22 ENCOUNTER — OFFICE VISIT (OUTPATIENT)
Dept: NEUROLOGY | Facility: CLINIC | Age: 84
End: 2019-07-22
Payer: MEDICARE

## 2019-07-22 VITALS
RESPIRATION RATE: 16 BRPM | SYSTOLIC BLOOD PRESSURE: 145 MMHG | BODY MASS INDEX: 22.51 KG/M2 | TEMPERATURE: 98 F | OXYGEN SATURATION: 99 % | DIASTOLIC BLOOD PRESSURE: 74 MMHG | HEIGHT: 69 IN | WEIGHT: 152 LBS | HEART RATE: 51 BPM

## 2019-07-22 DIAGNOSIS — M25.552 HIP PAIN, LEFT: Primary | ICD-10-CM

## 2019-07-22 DIAGNOSIS — M25.551 HIP PAIN, RIGHT: ICD-10-CM

## 2019-07-22 ASSESSMENT — MIFFLIN-ST. JEOR: SCORE: 1369.85

## 2019-07-22 ASSESSMENT — PAIN SCALES - GENERAL: PAINLEVEL: NO PAIN (1)

## 2019-07-22 NOTE — LETTER
7/22/2019       RE: Stanislav Lundy  [address]  [facility] [address]     Dear Colleague,    Thank you for referring your patient, Stanislav Lundy, to the [facility] NEUROLOGY at [facility]. Please see a copy of my visit note below.    [facility]    Stanislav Lundy MRN# [id number]   Age: 84 year old YOB: 1934     Requesting physician: Kurtis Davila            Assessment and Plan:   Assessment:  1.  Bilateral hip pain     Plan:  I am concerned the patient is presenting more with joint pain related to the hips rather than lumbar spinal stenosis.  He is not endorsing symptoms of pseudoclaudication.  I discussed how he would want to proceed and he would like to go back to [facility].  He will see Dr. Whiting to discuss what assessments are needed.  I have notified Dr. Whiting of this impending appointment.    If further concern in need for neurologic evaluation is present I would be happy to see the patient again    15 minutes with the patient over 50% counseling.    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  He also has a rotator cuff tear present.  The patient contacted me via my chart reporting that he feels he has some leg pain and weakness that is getting worse and requesting an appointment to discuss possible lumbar spinal stenosis.  The patient reports he has bilateral leg pain radiating from the groin or hip region down the anterior surface of the legs towards the knee.  He feels it is a deep shooting pain that typically is worse when he is laying down but can also be aggravated by movement.  The right side is worse than the left.  He does have a history of some  "sciatica radiating down the right leg that in the past was associated with a foot drop.  This pain is different and more concerning to him.  It is limiting his activities.  He has no bowel or bladder dysfunction.  He has no numbness associated with this and does not particularly endorse any significant back pain other than the long-standing back pain he has had associated with his sciatica.  The patient was given some exercises by his physical therapist including one where he would sit in a chair and bend all the way over and then slowly rolled back up to seated position.  He reports that he is tried this exercise just one time and it caused substantial increase in his pain.             Physical Exam:   /74   Pulse 51   Temp 98  F (36.7  C) (Oral)   Resp 16   Ht 1.753 m (5' 9\")   Wt 68.9 kg (152 lb)   SpO2 99%   BMI 22.45 kg/m     General appearance: The patient is well-groomed and cooperative with examination.  He is in no acute distress  Neurological examination: The patient has normal muscle bulk in the lower extremities as well as tone.  Strength testing in the lower extremities causes him significant pain but there is no clear weakness.  The most painful movement is hip flexion along with knee flexion and extension.  The patient has reflexes at the knees and ankles that are slightly diminished but bilaterally symmetric.  Plantar responses are flexor.  Sensory testing to vibration is absent at the toes with early extinction at the ankles and normal at the knees.  Pinprick distinction between sharp and dull is normal in the lower extremities except for the very distal area of the toes where he is inaccurate.  Patient's gait exam is stable.  He is able to stand without assistance.  Musculoskeletal examination: The patient endorses hip and groin pain with abduction of the hip         Data:   All laboratory data reviewed  All imaging studies reviewed by me             DATA for DOCUMENTATION:         " Past Medical History:     Patient Active Problem List   Diagnosis     Coronary atherosclerosis     Hyperlipidemia     Primary osteoarthritis involving multiple joints     Chronic rhinitis     Adhesive capsulitis of right shoulder     Atrial fibrillation (H)     Essential hypertension     Pure hypercholesterolemia     Hyponatremia     Incomplete tear of right rotator cuff     Multilevel degenerative disc disease     Neck pain, chronic     New onset a-fib (H)     Shoulder pain     Primary insomnia     Disorder of thyroid     Parsonage-Arteaga syndrome     Complete tear of right rotator cuff     Chronic sinusitis     Past Medical History:   Diagnosis Date     Chronic rhinitis     Abstracted 06/12/02     Coronary atherosclerosis of unspecified type of vessel, native or graft 1990     Generalized osteoarthrosis, unspecified site     especially low back     Other and unspecified hyperlipidemia        Also see scanned health assessment forms.       Past Surgical History:     Past Surgical History:   Procedure Laterality Date     C NONSPECIFIC PROCEDURE  1990    CABG, 3-vessel     C NONSPECIFIC PROCEDURE  1956    Partial thyroidectomy     C NONSPECIFIC PROCEDURE  1990, 1994    S/P bilateral rotator cuff  1990, bilateral again in 1994     C NONSPECIFIC PROCEDURE  1996    S/P Right forearm fracture secondary to fall with a re-do graft from hip  Abstracted 06/12/02     C NONSPECIFIC PROCEDURE      S/P T&A     C NONSPECIFIC PROCEDURE  1988    Dupuytren's contracture     C NONSPECIFIC PROCEDURE  ~2003    Hardware removed from right forearm     HC COLONOSCOPY THRU STOMA, DIAGNOSTIC  1/14/2004    Normal     HC FLEX SIGMOIDOSCOPY W/WO MIKEL SPEC BY BRUSH/WASH  5/9/2001    Normal to 60 cm            Social History:     Social History     Socioeconomic History     Marital status:      Spouse name: Saray     Number of children: 2     Years of education: Not on file     Highest education level: Not on file   Occupational History      Occupation: [employer]     Employer: RETIRED   Social Needs     Financial resource strain: Not on file     Food insecurity:     Worry: Not on file     Inability: Not on file     Transportation needs:     Medical: Not on file     Non-medical: Not on file   Tobacco Use     Smoking status: Former Smoker     Packs/day: 1.00     Types: Cigarettes     Last attempt to quit: 1978     Years since quittin.5     Smokeless tobacco: Never Used   Substance and Sexual Activity     Alcohol use: No     Drug use: No     Sexual activity: Not Currently   Lifestyle     Physical activity:     Days per week: Not on file     Minutes per session: Not on file     Stress: Not on file   Relationships     Social connections:     Talks on phone: Not on file     Gets together: Not on file     Attends Buddhist service: Not on file     Active member of club or organization: Not on file     Attends meetings of clubs or organizations: Not on file     Relationship status: Not on file     Intimate partner violence:     Fear of current or ex partner: Not on file     Emotionally abused: Not on file     Physically abused: Not on file     Forced sexual activity: Not on file   Other Topics Concern      Service No     Blood Transfusions No     Caffeine Concern Not Asked     Occupational Exposure Not Asked     Hobby Hazards Not Asked     Sleep Concern Not Asked     Stress Concern Not Asked     Weight Concern Not Asked     Special Diet Not Asked     Back Care Not Asked     Exercise Not Asked     Bike Helmet Not Asked     Seat Belt Not Asked     Self-Exams Not Asked   Social History Narrative    Semi-retired .              Family History:     Family History   Problem Relation Age of Onset     C.A.D. Mother          age 90     Hypertension Mother      Lipids Mother      C.A.D. Father          age 90, first MI in his mid 60's     Hypertension Father      Lipids Father      C.A.D. Brother          age  70 of heart problems and Prostate CA     Family History Negative Brother         Born 1928     Family History Negative Brother         Born      Family History Negative Sister         Born , has COPD     Family History Negative Sister         Born , has COPD     Family History Negative Brother          in MVA age 19     Cancer Brother         Born 1936, has had melanoma resected for apparent cure            Medications:     Current Outpatient Medications   Medication Sig     ALTACE 2.5 MG OR CAPS 1 CAPSULE EVERY DAY     amLODIPine (NORVASC) 5 MG tablet Take 2.5 mg by mouth      apixaban ANTICOAGULANT (ELIQUIS) 5 MG tablet      cyanocobalamin (VITAMIN  B-12) 1000 MCG tablet 4 times weekly     LIPITOR 10 MG OR TABS 1 tab PO QD (Once per day)     losartan (COZAAR) 25 MG tablet Take 25 mg by mouth daily     metoprolol succinate (TOPROL-XL) 25 MG 24 hr tablet Take 25 mg by mouth     NASACORT 55 MCG/ACT NA AERS 2 SPRAYS IN EACH NOSTRIL QD (Once per day)     NITROGLYCERIN 0.4 MG SL SUBL 1 sublingually prn chest discomfort, may repeat each five minutes if needed to a maximum of 3 consecutive tablets     Omega-3 Fatty Acids (OMEGA 3 PO)      TOPROL XL 25 MG OR TB24 1 TABLET DAILY     nitroGLYcerin (NITROSTAT) 0.4 MG sublingual tablet Place 0.4 mg under the tongue     No current facility-administered medications for this visit.               Review of Systems:   A comprehensive 10 point review of systems (constitutional, ENT, cardiac, peripheral vascular, lymphatic, respiratory, GI, , Musculoskeletal, skin, Neurological) was performed and found to be negative except as described in this note.     See intake form completed by patient    Again, thank you for allowing me to participate in the care of your patient.      Sincerely,    Jennifer Villa MD

## 2019-07-22 NOTE — PROGRESS NOTES
[facility]    Stanislav Lundy MRN# [id number]   Age: 84 year old YOB: 1934     Requesting physician: Kurtis Davila            Assessment and Plan:   Assessment:  1.  Bilateral hip pain     Plan:  I am concerned the patient is presenting more with joint pain related to the hips rather than lumbar spinal stenosis.  He is not endorsing symptoms of pseudoclaudication.  I discussed how he would want to proceed and he would like to go back to [facility].  He will see Dr. Whiting to discuss what assessments are needed.  I have notified Dr. Whiting of this impending appointment.    If further concern in need for neurologic evaluation is present I would be happy to see the patient again    15 minutes with the patient over 50% counseling.    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  He also has a rotator cuff tear present.  The patient contacted me via my chart reporting that he feels he has some leg pain and weakness that is getting worse and requesting an appointment to discuss possible lumbar spinal stenosis.  The patient reports he has bilateral leg pain radiating from the groin or hip region down the anterior surface of the legs towards the knee.  He feels it is a deep shooting pain that typically is worse when he is laying down but can also be aggravated by movement.  The right side is worse than the left.  He does have a history of some sciatica radiating down the right leg that in the past was associated with a foot drop.  This pain is different and more concerning to him.  It is limiting his activities.  He has no bowel or bladder dysfunction.  He has no numbness associated with this and does not particularly endorse any  "significant back pain other than the long-standing back pain he has had associated with his sciatica.  The patient was given some exercises by his physical therapist including one where he would sit in a chair and bend all the way over and then slowly rolled back up to seated position.  He reports that he is tried this exercise just one time and it caused substantial increase in his pain.             Physical Exam:   /74   Pulse 51   Temp 98  F (36.7  C) (Oral)   Resp 16   Ht 1.753 m (5' 9\")   Wt 68.9 kg (152 lb)   SpO2 99%   BMI 22.45 kg/m    General appearance: The patient is well-groomed and cooperative with examination.  He is in no acute distress  Neurological examination: The patient has normal muscle bulk in the lower extremities as well as tone.  Strength testing in the lower extremities causes him significant pain but there is no clear weakness.  The most painful movement is hip flexion along with knee flexion and extension.  The patient has reflexes at the knees and ankles that are slightly diminished but bilaterally symmetric.  Plantar responses are flexor.  Sensory testing to vibration is absent at the toes with early extinction at the ankles and normal at the knees.  Pinprick distinction between sharp and dull is normal in the lower extremities except for the very distal area of the toes where he is inaccurate.  Patient's gait exam is stable.  He is able to stand without assistance.  Musculoskeletal examination: The patient endorses hip and groin pain with abduction of the hip         Data:   All laboratory data reviewed  All imaging studies reviewed by me             DATA for DOCUMENTATION:         Past Medical History:     Patient Active Problem List   Diagnosis     Coronary atherosclerosis     Hyperlipidemia     Primary osteoarthritis involving multiple joints     Chronic rhinitis     Adhesive capsulitis of right shoulder     Atrial fibrillation (H)     Essential hypertension     Pure " hypercholesterolemia     Hyponatremia     Incomplete tear of right rotator cuff     Multilevel degenerative disc disease     Neck pain, chronic     New onset a-fib (H)     Shoulder pain     Primary insomnia     Disorder of thyroid     Parsonage-Arteaga syndrome     Complete tear of right rotator cuff     Chronic sinusitis     Past Medical History:   Diagnosis Date     Chronic rhinitis     Abstracted 06/12/02     Coronary atherosclerosis of unspecified type of vessel, native or graft 1990     Generalized osteoarthrosis, unspecified site     especially low back     Other and unspecified hyperlipidemia        Also see scanned health assessment forms.       Past Surgical History:     Past Surgical History:   Procedure Laterality Date     C NONSPECIFIC PROCEDURE  1990    CABG, 3-vessel     C NONSPECIFIC PROCEDURE  1956    Partial thyroidectomy     C NONSPECIFIC PROCEDURE  1990, 1994    S/P bilateral rotator cuff  1990, bilateral again in 1994     C NONSPECIFIC PROCEDURE  1996    S/P Right forearm fracture secondary to fall with a re-do graft from hip  Abstracted 06/12/02     C NONSPECIFIC PROCEDURE      S/P T&A     C NONSPECIFIC PROCEDURE  1988    Dupuytren's contracture     C NONSPECIFIC PROCEDURE  ~2003    Hardware removed from right forearm     HC COLONOSCOPY THRU STOMA, DIAGNOSTIC  1/14/2004    Normal     HC FLEX SIGMOIDOSCOPY W/WO MIKEL SPEC BY BRUSH/WASH  5/9/2001    Normal to 60 cm            Social History:     Social History     Socioeconomic History     Marital status:      Spouse name: Saray     Number of children: 2     Years of education: Not on file     Highest education level: Not on file   Occupational History     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     Employer: RETIRED   Social Needs     Financial resource strain: Not on file     Food insecurity:     Worry: Not on file     Inability: Not on file     Transportation needs:     Medical: Not on file     Non-medical: Not on file   Tobacco Use     Smoking  status: Former Smoker     Packs/day: 1.00     Types: Cigarettes     Last attempt to quit: 1978     Years since quittin.5     Smokeless tobacco: Never Used   Substance and Sexual Activity     Alcohol use: No     Drug use: No     Sexual activity: Not Currently   Lifestyle     Physical activity:     Days per week: Not on file     Minutes per session: Not on file     Stress: Not on file   Relationships     Social connections:     Talks on phone: Not on file     Gets together: Not on file     Attends Adventist service: Not on file     Active member of club or organization: Not on file     Attends meetings of clubs or organizations: Not on file     Relationship status: Not on file     Intimate partner violence:     Fear of current or ex partner: Not on file     Emotionally abused: Not on file     Physically abused: Not on file     Forced sexual activity: Not on file   Other Topics Concern      Service No     Blood Transfusions No     Caffeine Concern Not Asked     Occupational Exposure Not Asked     Hobby Hazards Not Asked     Sleep Concern Not Asked     Stress Concern Not Asked     Weight Concern Not Asked     Special Diet Not Asked     Back Care Not Asked     Exercise Not Asked     Bike Helmet Not Asked     Seat Belt Not Asked     Self-Exams Not Asked   Social History Narrative    Semi-retired .              Family History:     Family History   Problem Relation Age of Onset     C.A.D. Mother          age 90     Hypertension Mother      Lipids Mother      C.A.D. Father          age 90, first MI in his mid 60's     Hypertension Father      Lipids Father      C.A.D. Brother          age 70 of heart problems and Prostate CA     Family History Negative Brother         Born 1928     Family History Negative Brother         Born      Family History Negative Sister         Born 193, has COPD     Family History Negative Sister         Born , has COPD     Family History  Negative Brother          in MVA age 19     Cancer Brother         Born 1936, has had melanoma resected for apparent cure            Medications:     Current Outpatient Medications   Medication Sig     ALTACE 2.5 MG OR CAPS 1 CAPSULE EVERY DAY     amLODIPine (NORVASC) 5 MG tablet Take 2.5 mg by mouth      apixaban ANTICOAGULANT (ELIQUIS) 5 MG tablet      cyanocobalamin (VITAMIN  B-12) 1000 MCG tablet 4 times weekly     LIPITOR 10 MG OR TABS 1 tab PO QD (Once per day)     losartan (COZAAR) 25 MG tablet Take 25 mg by mouth daily     metoprolol succinate (TOPROL-XL) 25 MG 24 hr tablet Take 25 mg by mouth     NASACORT 55 MCG/ACT NA AERS 2 SPRAYS IN EACH NOSTRIL QD (Once per day)     NITROGLYCERIN 0.4 MG SL SUBL 1 sublingually prn chest discomfort, may repeat each five minutes if needed to a maximum of 3 consecutive tablets     Omega-3 Fatty Acids (OMEGA 3 PO)      TOPROL XL 25 MG OR TB24 1 TABLET DAILY     nitroGLYcerin (NITROSTAT) 0.4 MG sublingual tablet Place 0.4 mg under the tongue     No current facility-administered medications for this visit.               Review of Systems:   A comprehensive 10 point review of systems (constitutional, ENT, cardiac, peripheral vascular, lymphatic, respiratory, GI, , Musculoskeletal, skin, Neurological) was performed and found to be negative except as described in this note.     See intake form completed by patient  Answers for HPI/ROS submitted by the patient on 2019   General Symptoms: Yes  Skin Symptoms: No  HENT Symptoms: No  EYE SYMPTOMS: No  HEART SYMPTOMS: No  LUNG SYMPTOMS: No  INTESTINAL SYMPTOMS: No  URINARY SYMPTOMS: No  REPRODUCTIVE SYMPTOMS: No  SKELETAL SYMPTOMS: Yes  BLOOD SYMPTOMS: No  NERVOUS SYSTEM SYMPTOMS: Yes  MENTAL HEALTH SYMPTOMS: No  Fever: No  Loss of appetite: No  Weight loss: No  Weight gain: No  Fatigue: Yes  Night sweats: No  Chills: No  Increased stress: No  Excessive hunger: No  Excessive thirst: No  Feeling hot or cold when others  believe the temperature is normal: No  Loss of height: No  Post-operative complications: No  Surgical site pain: No  Hallucinations: No  Change in or Loss of Energy: No  Hyperactivity: No  Confusion: No  Back pain: No  Muscle aches: Yes  Neck pain: No  Joint pain: Yes  Bone pain: Yes  Muscle cramps: Yes  Muscle weakness: Yes  Joint stiffness: No  Bone fracture: No  Trouble with coordination: No  Dizziness or trouble with balance: Yes  Memory loss: No  Headache: No  Seizures: No  Speech problems: No  Tingling: Yes  Tremor: Yes  Weakness: Yes  Difficulty walking: Yes  Paralysis: No  Numbness: No

## 2019-07-22 NOTE — NURSING NOTE
Chief Complaint   Patient presents with     RECHECK     [facility] RETURN- BILATERAL PAIN IN EXTREMITIES       Brando Loaiza, EMT

## 2019-08-30 ENCOUNTER — MYC MEDICAL ADVICE (OUTPATIENT)
Dept: NEUROLOGY | Facility: CLINIC | Age: 84
End: 2019-08-30

## 2019-08-30 DIAGNOSIS — M48.062 SPINAL STENOSIS OF LUMBAR REGION WITH NEUROGENIC CLAUDICATION: Primary | ICD-10-CM

## 2019-09-03 ENCOUNTER — ANCILLARY PROCEDURE (OUTPATIENT)
Dept: MRI IMAGING | Facility: CLINIC | Age: 84
End: 2019-09-03
Attending: PSYCHIATRY & NEUROLOGY
Payer: MEDICARE

## 2019-09-03 DIAGNOSIS — M48.062 SPINAL STENOSIS OF LUMBAR REGION WITH NEUROGENIC CLAUDICATION: ICD-10-CM

## 2019-09-27 ENCOUNTER — HEALTH MAINTENANCE LETTER (OUTPATIENT)
Age: 84
End: 2019-09-27

## 2020-03-15 ENCOUNTER — HEALTH MAINTENANCE LETTER (OUTPATIENT)
Age: 85
End: 2020-03-15

## 2021-01-09 ENCOUNTER — HEALTH MAINTENANCE LETTER (OUTPATIENT)
Age: 86
End: 2021-01-09

## 2021-05-08 ENCOUNTER — HEALTH MAINTENANCE LETTER (OUTPATIENT)
Age: 86
End: 2021-05-08

## 2021-10-23 ENCOUNTER — HEALTH MAINTENANCE LETTER (OUTPATIENT)
Age: 86
End: 2021-10-23

## 2022-06-04 ENCOUNTER — HEALTH MAINTENANCE LETTER (OUTPATIENT)
Age: 87
End: 2022-06-04

## 2022-10-09 ENCOUNTER — HEALTH MAINTENANCE LETTER (OUTPATIENT)
Age: 87
End: 2022-10-09

## 2023-06-10 ENCOUNTER — HEALTH MAINTENANCE LETTER (OUTPATIENT)
Age: 88
End: 2023-06-10